# Patient Record
Sex: MALE
[De-identification: names, ages, dates, MRNs, and addresses within clinical notes are randomized per-mention and may not be internally consistent; named-entity substitution may affect disease eponyms.]

---

## 2019-02-01 ENCOUNTER — APPOINTMENT (OUTPATIENT)
Dept: ENDOCRINOLOGY | Facility: CLINIC | Age: 39
End: 2019-02-01
Payer: COMMERCIAL

## 2019-02-01 VITALS
HEART RATE: 99 BPM | BODY MASS INDEX: 28.97 KG/M2 | WEIGHT: 176 LBS | HEIGHT: 65.5 IN | DIASTOLIC BLOOD PRESSURE: 81 MMHG | SYSTOLIC BLOOD PRESSURE: 140 MMHG

## 2019-02-01 DIAGNOSIS — Z83.3 FAMILY HISTORY OF DIABETES MELLITUS: ICD-10-CM

## 2019-02-01 PROCEDURE — 99204 OFFICE O/P NEW MOD 45 MIN: CPT

## 2019-02-01 NOTE — REASON FOR VISIT
[Initial Eval - Existing Diagnosis] : an initial evaluation of an existing diagnosis [FreeTextEntry1] : diabetes

## 2019-02-01 NOTE — HISTORY OF PRESENT ILLNESS
[FreeTextEntry1] : Diabetes diagnosed in 2007 (age 26). both parents have diabetes.  has one younger brother (3 years younger) who does not have diabetes (brother is thinner).  parents were diagnosed in their 50s.\torey has not been testing glucose, doesn't even remember what kind of meter he has \par no polyuria, polydipsia, SOB, chest pain, or neuropathy symptoms \par no blurry vision.  up to date with ophtho, no DR\torey says last A1c was high, 9%, one month ago\torey doesn't like Victoza, getting diarrhea and abdominal cramps in the mornings, which interferes with his work meetings\torey feels bloated\torey admits to adding a lot of sugar to his coffee (can be 5 sugars!) and eating rice ( diet)\par \par Meds:\par metformin 500mg, Januvia, Victoza 1.8mg/day\par Keppra, Depakote 500mg, 3 tab/day\par losartan

## 2019-02-01 NOTE — PHYSICAL EXAM
[Alert] : alert [Healthy Appearance] : healthy appearance [Normal Voice/Communication] : normal voice communication [No Proptosis] : no proptosis [No Lid Lag] : no lid lag [Normal Hearing] : hearing was normal [Clear to Auscultation] : lungs were clear to auscultation bilaterally [Normal S1, S2] : normal S1 and S2 [Regular Rhythm] : with a regular rhythm [Pedal Pulses Normal] : the pedal pulses are present [No Edema] : there was no peripheral edema [de-identified] : L side roque than R

## 2019-02-01 NOTE — REVIEW OF SYSTEMS
[Recent Weight Gain (___ Lbs)] : recent [unfilled] ~Ulb weight gain [Dysphonia] : dysphonia [Diarrhea] : diarrhea [Hair Loss] : hair loss [All other systems negative] : All other systems negative

## 2019-02-01 NOTE — ASSESSMENT
[FreeTextEntry1] : Diabetes, uncontrolled. goal A1c < 7.0%.\par Potential complications of diabetes reviewed (blindness, kidney disease, nerve damage) and importance of glucose control discussed to prevent complications. Limit carb portions to one carb per meal and should be only half cup serving.  Avoid juice or sugared beverages (try no sugar in coffee).  Test sugars 4x/week: twice in am  (am goal < 130) and twice/week at  bedtime/post dinner (goal < 170). Foot care discussed. \par Discontinue Januvia, which is redundant with GLP1 agonist.  Change Victoza to once/week Ozempic to make dosing easier and since he is having side effects to Victoza, though I think some of these side effects may be due to metformin.  Recommended splitting metformin dose to 2 tab bid, rather than 4 tabs in the evening.  Add Invokana 100mg to get A1c to goal.\par exercise goal 30 min/day.\par samples given for Ozempic and Invokana\par RTO 3 months, will do labs at next visit.

## 2019-02-04 ENCOUNTER — MEDICATION RENEWAL (OUTPATIENT)
Age: 39
End: 2019-02-04

## 2019-02-04 RX ORDER — CANAGLIFLOZIN 100 MG/1
100 TABLET, FILM COATED ORAL
Qty: 90 | Refills: 1 | Status: DISCONTINUED | OUTPATIENT
Start: 2019-02-01 | End: 2019-02-04

## 2019-02-06 ENCOUNTER — MEDICATION RENEWAL (OUTPATIENT)
Age: 39
End: 2019-02-06

## 2019-02-06 RX ORDER — METFORMIN HYDROCHLORIDE 1000 MG/1
1000 TABLET, EXTENDED RELEASE ORAL
Qty: 180 | Refills: 3 | Status: DISCONTINUED | COMMUNITY
Start: 1900-01-01 | End: 2019-02-06

## 2019-02-14 ENCOUNTER — MEDICATION RENEWAL (OUTPATIENT)
Age: 39
End: 2019-02-14

## 2019-05-10 ENCOUNTER — MEDICATION RENEWAL (OUTPATIENT)
Age: 39
End: 2019-05-10

## 2019-05-24 ENCOUNTER — APPOINTMENT (OUTPATIENT)
Dept: ENDOCRINOLOGY | Facility: CLINIC | Age: 39
End: 2019-05-24

## 2019-08-21 ENCOUNTER — APPOINTMENT (OUTPATIENT)
Dept: ENDOCRINOLOGY | Facility: CLINIC | Age: 39
End: 2019-08-21
Payer: COMMERCIAL

## 2019-08-21 VITALS
WEIGHT: 167 LBS | BODY MASS INDEX: 27.37 KG/M2 | SYSTOLIC BLOOD PRESSURE: 119 MMHG | HEART RATE: 90 BPM | DIASTOLIC BLOOD PRESSURE: 75 MMHG

## 2019-08-21 PROCEDURE — 99214 OFFICE O/P EST MOD 30 MIN: CPT

## 2019-08-21 RX ORDER — LANCETS
EACH MISCELLANEOUS
Qty: 1 | Refills: 3 | Status: DISCONTINUED | COMMUNITY
Start: 2019-05-10 | End: 2019-08-21

## 2019-08-21 NOTE — ASSESSMENT
[FreeTextEntry1] : Diabetes, goal A1c < 7.0%.\par continue current regimen.   Can titrate Ozempic or Jardiance dose, if needed, but if A1c is in the low 6 range, will reduce metformin dose. recommended testing some post prandial sugars because often, glucose may be normal before eating but high after eating.\par labs today\par RTO 3-6 months, depending on A1c result

## 2019-08-21 NOTE — HISTORY OF PRESENT ILLNESS
[FreeTextEntry1] : one touch ultra meter: 74, 102, 101, 97, 86, 101 (testing am only, testing infrequently)\par lost weight, intentionally,  eating better and exercising more\par unable to eat large amounts, probably due to Ozempic,  and fewer side effects than Victoza\par traveling for work for past 9 months.  eating oat meal for breakfast\par up to date with ophtho, went earlier this year, eyes are ok.\par vision gets blurry after he is looking at computer for too long\par no polyuria, polydipsia, SOB, chest pain, or neuropathy symptoms \par \par Meds:\par Glucophage XR 500mg 2tab bid,  Ozempic 0.5 mg/week, Jardiance 10mg\par [id number] bid, Depakote 500mg, 3 tab/day\par losartan 25mg

## 2019-08-21 NOTE — PHYSICAL EXAM
[Alert] : alert [Healthy Appearance] : healthy appearance [Normal Voice/Communication] : normal voice communication [No Proptosis] : no proptosis [No Lid Lag] : no lid lag [Normal Hearing] : hearing was normal [Clear to Auscultation] : lungs were clear to auscultation bilaterally [Normal S1, S2] : normal S1 and S2 [Regular Rhythm] : with a regular rhythm [Pedal Pulses Normal] : the pedal pulses are present [No Edema] : there was no peripheral edema [Normal Sensation on Monofilament Testing] : normal sensation on monofilament testing of lower extremities [Normal Affect] : the affect was normal [Normal Mood] : the mood was normal [Foot Ulcers] : no foot ulcers [de-identified] : thyroid palpable [de-identified] : mild acanthosis nigricans

## 2019-08-22 LAB
ALBUMIN SERPL ELPH-MCNC: 4.4 G/DL
ALP BLD-CCNC: 35 U/L
ALT SERPL-CCNC: 21 U/L
ANION GAP SERPL CALC-SCNC: 13 MMOL/L
AST SERPL-CCNC: 19 U/L
BILIRUB SERPL-MCNC: 0.3 MG/DL
BUN SERPL-MCNC: 12 MG/DL
CALCIUM SERPL-MCNC: 9.7 MG/DL
CHLORIDE SERPL-SCNC: 101 MMOL/L
CHOLEST SERPL-MCNC: 185 MG/DL
CHOLEST/HDLC SERPL: 4 RATIO
CO2 SERPL-SCNC: 27 MMOL/L
CREAT SERPL-MCNC: 0.92 MG/DL
CREAT SPEC-SCNC: 190 MG/DL
ESTIMATED AVERAGE GLUCOSE: 131 MG/DL
GLUCOSE SERPL-MCNC: 107 MG/DL
HBA1C MFR BLD HPLC: 6.2 %
HDLC SERPL-MCNC: 46 MG/DL
LDLC SERPL CALC-MCNC: 110 MG/DL
MICROALBUMIN 24H UR DL<=1MG/L-MCNC: 1.7 MG/DL
MICROALBUMIN/CREAT 24H UR-RTO: 9 MG/G
POTASSIUM SERPL-SCNC: 4.6 MMOL/L
PROT SERPL-MCNC: 7.2 G/DL
SODIUM SERPL-SCNC: 141 MMOL/L
TRIGL SERPL-MCNC: 143 MG/DL
TSH SERPL-ACNC: 2.27 UIU/ML

## 2019-09-24 ENCOUNTER — MEDICATION RENEWAL (OUTPATIENT)
Age: 39
End: 2019-09-24

## 2019-10-14 ENCOUNTER — APPOINTMENT (OUTPATIENT)
Dept: ENDOCRINOLOGY | Facility: CLINIC | Age: 39
End: 2019-10-14

## 2019-11-13 ENCOUNTER — APPOINTMENT (OUTPATIENT)
Dept: ENDOCRINOLOGY | Facility: CLINIC | Age: 39
End: 2019-11-13
Payer: COMMERCIAL

## 2019-11-13 ENCOUNTER — TRANSCRIPTION ENCOUNTER (OUTPATIENT)
Age: 39
End: 2019-11-13

## 2019-11-13 VITALS
SYSTOLIC BLOOD PRESSURE: 115 MMHG | BODY MASS INDEX: 27.53 KG/M2 | DIASTOLIC BLOOD PRESSURE: 72 MMHG | WEIGHT: 168 LBS | HEART RATE: 93 BPM

## 2019-11-13 PROCEDURE — 99214 OFFICE O/P EST MOD 30 MIN: CPT

## 2019-11-13 NOTE — HISTORY OF PRESENT ILLNESS
[FreeTextEntry1] : one touch Mini meter: \par am: 128, 117, 116, 74\par PM: 125, 150, 106, 167, 136\par still traveling a lot.  weight has been stable.\par recently treated for sinus infection.\par up to date with ophtho, and has appt for December\par no polyuria, polydipsia, SOB, chest pain, or neuropathy symptoms \par got flu vaccine.\par energy is good\par \par Meds:\par Glucophage XR 500mg 1 tab bid,  Ozempic 0.5 mg/week, Jardiance 10mg\par atrovastatin 10mg\par [id number] bid, Depakote 500mg, 3 tab/day\par losartan 25mg

## 2019-11-13 NOTE — ASSESSMENT
[FreeTextEntry1] : Diabetes, goal A1c < 7.0%.\par continue current regimen.   \par increased physical activity recommended.\par can titrate up Ozempic dose to 1mg/week, if needed (if A1c is high or if gaining weight).\par RTO 6 months

## 2019-11-13 NOTE — PHYSICAL EXAM
[Alert] : alert [Healthy Appearance] : healthy appearance [Normal Voice/Communication] : normal voice communication [Normal Hearing] : hearing was normal [No Proptosis] : no proptosis [No Lid Lag] : no lid lag [Clear to Auscultation] : lungs were clear to auscultation bilaterally [Normal S1, S2] : normal S1 and S2 [Regular Rhythm] : with a regular rhythm [Pedal Pulses Normal] : the pedal pulses are present [No Edema] : there was no peripheral edema [Normal Affect] : the affect was normal [Normal Sensation on Monofilament Testing] : normal sensation on monofilament testing of lower extremities [Normal Mood] : the mood was normal [de-identified] : thyroid palpable [Foot Ulcers] : no foot ulcers [de-identified] : mild acanthosis nigricans

## 2019-11-13 NOTE — DATA REVIEWED
[FreeTextEntry1] : 8/19: A1c 6.2%, Cr 0.92, tot chol 185, trig 143, HDL 46, , urine microalbumin 9, TSH 2.27

## 2019-11-14 LAB
ALBUMIN SERPL ELPH-MCNC: 4.5 G/DL
ALP BLD-CCNC: 43 U/L
ALT SERPL-CCNC: 18 U/L
ANION GAP SERPL CALC-SCNC: 17 MMOL/L
AST SERPL-CCNC: 17 U/L
BILIRUB SERPL-MCNC: 0.3 MG/DL
BUN SERPL-MCNC: 14 MG/DL
CALCIUM SERPL-MCNC: 10.1 MG/DL
CHLORIDE SERPL-SCNC: 98 MMOL/L
CHOLEST SERPL-MCNC: 137 MG/DL
CHOLEST/HDLC SERPL: 3.3 RATIO
CO2 SERPL-SCNC: 26 MMOL/L
CREAT SERPL-MCNC: 1.01 MG/DL
ESTIMATED AVERAGE GLUCOSE: 134 MG/DL
GLUCOSE SERPL-MCNC: 186 MG/DL
HBA1C MFR BLD HPLC: 6.3 %
HDLC SERPL-MCNC: 42 MG/DL
LDLC SERPL CALC-MCNC: 47 MG/DL
POTASSIUM SERPL-SCNC: 4.7 MMOL/L
PROT SERPL-MCNC: 7.3 G/DL
SODIUM SERPL-SCNC: 141 MMOL/L
TRIGL SERPL-MCNC: 238 MG/DL

## 2019-12-11 ENCOUNTER — TRANSCRIPTION ENCOUNTER (OUTPATIENT)
Age: 39
End: 2019-12-11

## 2020-01-10 ENCOUNTER — FORM ENCOUNTER (OUTPATIENT)
Age: 40
End: 2020-01-10

## 2020-01-11 ENCOUNTER — OUTPATIENT (OUTPATIENT)
Dept: OUTPATIENT SERVICES | Facility: HOSPITAL | Age: 40
LOS: 1 days | End: 2020-01-11

## 2020-01-11 ENCOUNTER — APPOINTMENT (OUTPATIENT)
Dept: ULTRASOUND IMAGING | Facility: CLINIC | Age: 40
End: 2020-01-11
Payer: COMMERCIAL

## 2020-01-11 PROCEDURE — 76536 US EXAM OF HEAD AND NECK: CPT | Mod: 26

## 2020-01-17 ENCOUNTER — TRANSCRIPTION ENCOUNTER (OUTPATIENT)
Age: 40
End: 2020-01-17

## 2020-01-27 ENCOUNTER — TRANSCRIPTION ENCOUNTER (OUTPATIENT)
Age: 40
End: 2020-01-27

## 2020-04-15 ENCOUNTER — TRANSCRIPTION ENCOUNTER (OUTPATIENT)
Age: 40
End: 2020-04-15

## 2020-04-28 ENCOUNTER — TRANSCRIPTION ENCOUNTER (OUTPATIENT)
Age: 40
End: 2020-04-28

## 2020-05-04 ENCOUNTER — TRANSCRIPTION ENCOUNTER (OUTPATIENT)
Age: 40
End: 2020-05-04

## 2020-05-06 ENCOUNTER — TRANSCRIPTION ENCOUNTER (OUTPATIENT)
Age: 40
End: 2020-05-06

## 2020-05-20 ENCOUNTER — APPOINTMENT (OUTPATIENT)
Dept: ENDOCRINOLOGY | Facility: CLINIC | Age: 40
End: 2020-05-20
Payer: COMMERCIAL

## 2020-05-20 PROCEDURE — 99214 OFFICE O/P EST MOD 30 MIN: CPT | Mod: 95

## 2020-05-20 RX ORDER — LOSARTAN POTASSIUM 25 MG/1
25 TABLET, FILM COATED ORAL DAILY
Qty: 90 | Refills: 3 | Status: DISCONTINUED | COMMUNITY
Start: 1900-01-01 | End: 2020-05-20

## 2020-05-21 NOTE — DATA REVIEWED
[FreeTextEntry1] : 5/20: A1c 7.1%, urine microalbumin/cr 6, B12 756, TSH 2.76\par 11/19: A1c 6.3%, tot chol 137, trig 238, HDL 42, LDL 47\par 8/19: A1c 6.2%, Cr 0.92, tot chol 185, trig 143, HDL 46, , urine microalbumin 9, TSH 2.27. \par \par thyroid sono\par 3mm nodule

## 2020-05-21 NOTE — ASSESSMENT
[FreeTextEntry1] : Diabetes, goal A1c < 7.0%. (near goal)\par A1c increase since last visit probably due to reduced physical activity\par Recommended increasing physical activity, doing some exercise for 10 min every few hours so he's not sitting all day.\par suggested melatonin, shutting of devices at night to improve sleep.  Exercising during the day (but not within 2 hours of bedtime) also may help, to make his body more tired at the end o the day.\par It is not know whether being on ACEI or ARB has negative outcomes with Covid infection and these meds are beneficial for heart and kidneys, but since his urine microalbumin is normal (and always has been) and BP is normal, will discontinue losartan for now.  If urine microalbumin increases or BP increases, can restart.\par continue statin therapy.\par RTO 6 months

## 2020-05-21 NOTE — REASON FOR VISIT
[Home] : at home, [unfilled] , at the time of the visit. [Medical Office: ([facility])___] : at the medical office located in  [Follow - Up] : a follow-up visit [DM Type 2] : DM Type 2 [Verbal consent obtained from patient] : the patient, [unfilled]

## 2020-05-21 NOTE — HISTORY OF PRESENT ILLNESS
[Verbal consent obtained from patient] : the patient, [unfilled] [FreeTextEntry1] : no health issues since last visit\par has been sitting a lot during pandemic\par no polyuria, polydipsia, SOB, chest pain, or neuropathy symptoms \par main issue is sleep disturbance.  He has been waking up around 1am and then cannot fall back asleep until 4am, and then is very tired in the morning.  Last night, he fell asleep at 9pm, woke up at 1am and was awake until 4am.  He is not getting to urinate (that is not what is waking him up).  He feels sleepy around 4-5pm but doesn't take a nap.  He does not snore (that he knows of)\par He wonders if he should stop losartan because of reports that Covid infection may be worse in patients taking these meds (ACEI and ARB)\par \par Meds:\par Glucophage XR 500mg 1 tab bid,  Ozempic 0.5 mg/week, Jardiance 10mg\par atrovastatin 10mg\par [id number] bid, Depakote 500mg, 3 tab/day\par losartan 25mg

## 2020-09-07 ENCOUNTER — RX RENEWAL (OUTPATIENT)
Age: 40
End: 2020-09-07

## 2020-09-12 ENCOUNTER — WALK IN (OUTPATIENT)
Dept: URGENT CARE | Age: 40
End: 2020-09-12

## 2020-09-12 VITALS
OXYGEN SATURATION: 99 % | SYSTOLIC BLOOD PRESSURE: 120 MMHG | DIASTOLIC BLOOD PRESSURE: 80 MMHG | RESPIRATION RATE: 20 BRPM | HEART RATE: 92 BPM | TEMPERATURE: 97.6 F

## 2020-09-12 DIAGNOSIS — R51.9 NONINTRACTABLE HEADACHE, UNSPECIFIED CHRONICITY PATTERN, UNSPECIFIED HEADACHE TYPE: ICD-10-CM

## 2020-09-12 DIAGNOSIS — J02.9 VIRAL PHARYNGITIS: Primary | ICD-10-CM

## 2020-09-12 DIAGNOSIS — J02.9 ACUTE PHARYNGITIS, UNSPECIFIED: ICD-10-CM

## 2020-09-12 DIAGNOSIS — R51.9 HEADACHE: ICD-10-CM

## 2020-09-12 DIAGNOSIS — J06.9 ACUTE UPPER RESPIRATORY INFECTION, UNSPECIFIED: ICD-10-CM

## 2020-09-12 DIAGNOSIS — R05.9 COUGH: ICD-10-CM

## 2020-09-12 DIAGNOSIS — J06.9 UPPER RESPIRATORY TRACT INFECTION, UNSPECIFIED TYPE: ICD-10-CM

## 2020-09-12 DIAGNOSIS — Z20.822 SUSPECTED COVID-19 VIRUS INFECTION: ICD-10-CM

## 2020-09-12 PROCEDURE — 99202 OFFICE O/P NEW SF 15 MIN: CPT | Performed by: FAMILY MEDICINE

## 2020-09-12 PROCEDURE — U0003 INFECTIOUS AGENT DETECTION BY NUCLEIC ACID (DNA OR RNA); SEVERE ACUTE RESPIRATORY SYNDROME CORONAVIRUS 2 (SARS-COV-2) (CORONAVIRUS DISEASE [COVID-19]), AMPLIFIED PROBE TECHNIQUE, MAKING USE OF HIGH THROUGHPUT TECHNOLOGIES AS DESCRIBED BY CMS-2020-01-R: HCPCS | Performed by: FAMILY MEDICINE

## 2020-09-12 RX ORDER — LEVETIRACETAM 1000 MG/1
1000 TABLET ORAL 2 TIMES DAILY
COMMUNITY

## 2020-09-12 RX ORDER — DIVALPROEX SODIUM 500 MG/1
500 TABLET, EXTENDED RELEASE ORAL DAILY
COMMUNITY

## 2020-09-12 RX ORDER — METFORMIN HYDROCHLORIDE 500 MG/1
500 TABLET, EXTENDED RELEASE ORAL
COMMUNITY

## 2020-09-14 LAB
SARS-COV-2 RNA SPEC QL NAA+PROBE: NOT DETECTED
SPECIMEN SOURCE: NORMAL

## 2020-09-25 ENCOUNTER — RX RENEWAL (OUTPATIENT)
Age: 40
End: 2020-09-25

## 2020-10-12 ENCOUNTER — TRANSCRIPTION ENCOUNTER (OUTPATIENT)
Age: 40
End: 2020-10-12

## 2020-11-22 ENCOUNTER — TRANSCRIPTION ENCOUNTER (OUTPATIENT)
Age: 40
End: 2020-11-22

## 2020-12-21 ENCOUNTER — RX RENEWAL (OUTPATIENT)
Age: 40
End: 2020-12-21

## 2021-02-16 ENCOUNTER — APPOINTMENT (OUTPATIENT)
Dept: ENDOCRINOLOGY | Facility: CLINIC | Age: 41
End: 2021-02-16
Payer: COMMERCIAL

## 2021-02-16 VITALS — WEIGHT: 164 LBS | BODY MASS INDEX: 26.88 KG/M2

## 2021-02-16 PROCEDURE — 99213 OFFICE O/P EST LOW 20 MIN: CPT | Mod: 95

## 2021-02-16 RX ORDER — DIVALPROEX SODIUM 500 MG/1
500 TABLET, DELAYED RELEASE ORAL
Refills: 0 | Status: DISCONTINUED | COMMUNITY
End: 2021-02-16

## 2021-02-16 RX ORDER — METFORMIN HYDROCHLORIDE 500 MG/1
500 TABLET, FILM COATED, EXTENDED RELEASE ORAL
Qty: 360 | Refills: 1 | Status: DISCONTINUED | COMMUNITY
Start: 2019-02-06 | End: 2021-02-16

## 2021-02-16 NOTE — DATA REVIEWED
[FreeTextEntry1] : 12/20: A1c 6.6%, tot chol 157, HDL 42, LDL 90, trig 152, CMP normal\par 5/20: A1c 7.1%, urine microalbumin/cr 6, B12 756, TSH 2.76\par 11/19: A1c 6.3%, tot chol 137, trig 238, HDL 42, LDL 47\par 8/19: A1c 6.2%, Cr 0.92, tot chol 185, trig 143, HDL 46, , urine microalbumin 9, TSH 2.27. \par \par thyroid sono, 1/20: homogenous, normovascular\par L mid pole cyst 3mm

## 2021-02-16 NOTE — REASON FOR VISIT
[Home] : at home, [unfilled] , at the time of the visit. [Medical Office: ([facility])___] : at the medical office located in  [Verbal consent obtained from patient] : the patient, [unfilled] [Follow - Up] : a follow-up visit [DM Type 2] : DM Type 2

## 2021-02-16 NOTE — ASSESSMENT
[FreeTextEntry1] : Diabetes, goal A1c < 7.0%.\par continue current regimen.   \par will send [facility] form to check labs before next appointment \par ophtho recommended.\par RTO 6 months

## 2021-02-16 NOTE — HISTORY OF PRESENT ILLNESS
[FreeTextEntry1] : no health issues since last visit\par moved from [place] to [place], but still in a 3rd floor walk up so he gets his walking in\par no dizzy spells\par no SOB, chest pain, or neuropathy symptoms \par has not seen ophtho recently.  had appointment in December but it was canceled by ophtho\par tested negative for Covid twice, last test was a few weeks ago\par current weight is 164 lb\par \par Meds:\par Glucophage XR 500mg 1 tab bid,  Ozempic 0.5 mg/week, Jardiance 10mg\par atrovastatin 10mg\par [id number] bid, Depakote 500mg, 3 tab/day\par losartan 25mg

## 2021-03-01 ENCOUNTER — RX RENEWAL (OUTPATIENT)
Age: 41
End: 2021-03-01

## 2021-03-01 ENCOUNTER — NON-APPOINTMENT (OUTPATIENT)
Age: 41
End: 2021-03-01

## 2021-03-09 ENCOUNTER — TRANSCRIPTION ENCOUNTER (OUTPATIENT)
Age: 41
End: 2021-03-09

## 2021-03-09 ENCOUNTER — NON-APPOINTMENT (OUTPATIENT)
Age: 41
End: 2021-03-09

## 2021-03-18 ENCOUNTER — APPOINTMENT (OUTPATIENT)
Dept: FAMILY MEDICINE | Facility: CLINIC | Age: 41
End: 2021-03-18
Payer: COMMERCIAL

## 2021-03-18 VITALS
DIASTOLIC BLOOD PRESSURE: 77 MMHG | BODY MASS INDEX: 26.36 KG/M2 | HEIGHT: 66 IN | TEMPERATURE: 97.2 F | OXYGEN SATURATION: 99 % | HEART RATE: 86 BPM | WEIGHT: 164 LBS | SYSTOLIC BLOOD PRESSURE: 122 MMHG

## 2021-03-18 PROCEDURE — 99386 PREV VISIT NEW AGE 40-64: CPT | Mod: 25

## 2021-03-18 PROCEDURE — G0442 ANNUAL ALCOHOL SCREEN 15 MIN: CPT

## 2021-03-18 PROCEDURE — 36415 COLL VENOUS BLD VENIPUNCTURE: CPT

## 2021-03-18 PROCEDURE — 99072 ADDL SUPL MATRL&STAF TM PHE: CPT

## 2021-03-18 NOTE — HEALTH RISK ASSESSMENT
[Good] : ~his/her~  mood as  good [] : Yes [0-5] : 0-5 [Yes] : Yes [Monthly or less (1 pt)] : Monthly or less (1 point) [1 or 2 (0 pts)] : 1 or 2 (0 points) [Never (0 pts)] : Never (0 points) [No] : In the past 12 months have you used drugs other than those required for medical reasons? No [0] : 2) Feeling down, depressed, or hopeless: Not at all (0) [Audit-CScore] : 1 [de-identified] : walking [de-identified] : fruits, veggies  [[id number]] : 0 [HIV test declined] : HIV test declined [Hepatitis C test declined] : Hepatitis C test declined [Change in mental status noted] : No change in mental status noted [Language] : denies difficulty with language [None] : None [With Family] : lives with family [# of Members in Household ___] :  household currently consist of [unfilled] member(s) [Employed] : employed [] :  [# Of Children ___] : has [unfilled] children [Sexually Active] : sexually active [High Risk Behavior] : no high risk behavior [Feels Safe at Home] : Feels safe at home [Fully functional (bathing, dressing, toileting, transferring, walking, feeding)] : Fully functional (bathing, dressing, toileting, transferring, walking, feeding) [Fully functional (using the telephone, shopping, preparing meals, housekeeping, doing laundry, using] : Fully functional and needs no help or supervision to perform IADLs (using the telephone, shopping, preparing meals, housekeeping, doing laundry, using transportation, managing medications and managing finances) [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

## 2021-03-18 NOTE — HISTORY OF PRESENT ILLNESS
[FreeTextEntry1] : establish care  [de-identified] : 41 yo m presents to establish care. \par Has not followed with pcp in a few years, all was normal last time. \par Follows with endo at [facility] for his thyroid, diabetes, and cholesterol. \par No complaints today aside from being tired, falling asleep, but trouble staying asleep.\par One caffinated beverage in the AM, does not wake up with racing thoughts, urination wakes him up. Mentioned recent concern. Mentioned glass of water prior to bed.

## 2021-03-18 NOTE — PLAN
[FreeTextEntry1] : follow up labs, labs drawn in office \par \par discussed urinary freq/ drinking prior to going to sleep, encouraged not to do so\par if still trouble sleeping, will discuss next steps\par also will follow up psa to r/o prostate involvement \par will follow up labs to make sure not indication of diabetes \par unlikely related to mood/ anxiety based on current description

## 2021-03-23 ENCOUNTER — TRANSCRIPTION ENCOUNTER (OUTPATIENT)
Age: 41
End: 2021-03-23

## 2021-03-23 LAB
25(OH)D3 SERPL-MCNC: 43.3 NG/ML
ALBUMIN SERPL ELPH-MCNC: 4.3 G/DL
ALP BLD-CCNC: 39 U/L
ALT SERPL-CCNC: 12 U/L
ANION GAP SERPL CALC-SCNC: 18 MMOL/L
AST SERPL-CCNC: 16 U/L
BASOPHILS # BLD AUTO: 0.03 K/UL
BASOPHILS NFR BLD AUTO: 0.3 %
BILIRUB SERPL-MCNC: 0.4 MG/DL
BUN SERPL-MCNC: 10 MG/DL
CALCIUM SERPL-MCNC: 9.5 MG/DL
CHLORIDE SERPL-SCNC: 102 MMOL/L
CHOLEST SERPL-MCNC: 126 MG/DL
CO2 SERPL-SCNC: 24 MMOL/L
CREAT SERPL-MCNC: 0.99 MG/DL
EOSINOPHIL # BLD AUTO: 0.28 K/UL
EOSINOPHIL NFR BLD AUTO: 2.9 %
ESTIMATED AVERAGE GLUCOSE: 146 MG/DL
GLUCOSE SERPL-MCNC: 109 MG/DL
HBA1C MFR BLD HPLC: 6.7 %
HCT VFR BLD CALC: 49.2 %
HDLC SERPL-MCNC: 40 MG/DL
HGB BLD-MCNC: 16 G/DL
IMM GRANULOCYTES NFR BLD AUTO: 0.2 %
LDLC SERPL CALC-MCNC: 61 MG/DL
LYMPHOCYTES # BLD AUTO: 3.88 K/UL
LYMPHOCYTES NFR BLD AUTO: 39.9 %
MAN DIFF?: NORMAL
MCHC RBC-ENTMCNC: 30.1 PG
MCHC RBC-ENTMCNC: 32.5 GM/DL
MCV RBC AUTO: 92.5 FL
MONOCYTES # BLD AUTO: 0.67 K/UL
MONOCYTES NFR BLD AUTO: 6.9 %
NEUTROPHILS # BLD AUTO: 4.84 K/UL
NEUTROPHILS NFR BLD AUTO: 49.8 %
NONHDLC SERPL-MCNC: 86 MG/DL
PLATELET # BLD AUTO: 178 K/UL
POTASSIUM SERPL-SCNC: 4.2 MMOL/L
PROT SERPL-MCNC: 6.8 G/DL
PSA FREE FLD-MCNC: 42 %
PSA FREE SERPL-MCNC: 0.3 NG/ML
PSA SERPL-MCNC: 0.73 NG/ML
RBC # BLD: 5.32 M/UL
RBC # FLD: 12.9 %
SODIUM SERPL-SCNC: 143 MMOL/L
TRIGL SERPL-MCNC: 123 MG/DL
TSH SERPL-ACNC: 4.01 UIU/ML
WBC # FLD AUTO: 9.72 K/UL

## 2021-04-06 ENCOUNTER — RX RENEWAL (OUTPATIENT)
Age: 41
End: 2021-04-06

## 2021-06-03 ENCOUNTER — APPOINTMENT (OUTPATIENT)
Dept: NEUROLOGY | Facility: CLINIC | Age: 41
End: 2021-06-03
Payer: COMMERCIAL

## 2021-06-03 VITALS
OXYGEN SATURATION: 97 % | DIASTOLIC BLOOD PRESSURE: 79 MMHG | HEIGHT: 66 IN | BODY MASS INDEX: 26.84 KG/M2 | RESPIRATION RATE: 16 BRPM | HEART RATE: 78 BPM | TEMPERATURE: 97.9 F | SYSTOLIC BLOOD PRESSURE: 157 MMHG | WEIGHT: 167 LBS

## 2021-06-03 PROCEDURE — 99204 OFFICE O/P NEW MOD 45 MIN: CPT

## 2021-06-03 PROCEDURE — 99072 ADDL SUPL MATRL&STAF TM PHE: CPT

## 2021-06-03 RX ORDER — METFORMIN ER 500 MG 500 MG/1
500 TABLET ORAL
Qty: 360 | Refills: 1 | Status: DISCONTINUED | COMMUNITY
Start: 2020-05-04 | End: 2021-06-03

## 2021-06-03 NOTE — HISTORY OF PRESENT ILLNESS
[FreeTextEntry1] : \par Rickey is a 41 yo man moved to [place]\par \par Notes from 2018 showed his LEV was 2000mg, bid \par \par Onset was in juvenile age group, age 8, per notes.  He recalls being in his living room, he felt frozen and could not turn his head, then dropped, found by family members.\par MRI was negative, EEG showed abnormalities but not specific.\par \par 2015 moved to [facility] from [place], followed at [facility] then, but transferred to Dr. Soto at [facility].  \par Most recent seizure was in 2014, that occurred while attempting to stop levetiracetam completely, per notes.\par \par He is noted to have irritability in the morning, but his wife has known him since he was on LEV.\par Depakote is well-tolerated, though has noted hair thinning gradually since 2009-10.\par \par Supplements include fish oil and multivitamins.\par \par \par Diabetic since 2007, runs on both side of the family.

## 2021-06-03 NOTE — DISCUSSION/SUMMARY
[FreeTextEntry1] : Impression:\par 1) history of epilepsy,  seizure free since 2015, but on high dose dual therapy, and may not need to be on both at high doses, now 7 years seizure-free.  The most recent seizure in 2014 came with LEV dropping to zero, per notes, but unclear for how long and whether the taper was gradual, and what dose depakote was at the time.\par 2) hair thinning - possible adverse effects of depakote, so will be interested in seeing if we can limit the dose.\par \par Plan:\par 1) no EEGs recently, rEEG to start, but possibly ambEEG\par 2) MRI brain\par 3) levels today, vit D/B12 folate due to high doses and hair losses\par 4) require more thorough notes to evaluate potential for making medication changes.\par 5) RTC in 6 weeks

## 2021-06-04 LAB
25(OH)D3 SERPL-MCNC: 44.8 NG/ML
ALBUMIN SERPL ELPH-MCNC: 4.6 G/DL
ALP BLD-CCNC: 44 U/L
ALT SERPL-CCNC: 16 U/L
ANION GAP SERPL CALC-SCNC: 14 MMOL/L
AST SERPL-CCNC: 16 U/L
BASOPHILS # BLD AUTO: 0.04 K/UL
BASOPHILS NFR BLD AUTO: 0.5 %
BILIRUB SERPL-MCNC: 0.2 MG/DL
BUN SERPL-MCNC: 13 MG/DL
CALCIUM SERPL-MCNC: 10.2 MG/DL
CHLORIDE SERPL-SCNC: 102 MMOL/L
CO2 SERPL-SCNC: 26 MMOL/L
CREAT SERPL-MCNC: 0.96 MG/DL
EOSINOPHIL # BLD AUTO: 0.25 K/UL
EOSINOPHIL NFR BLD AUTO: 3.4 %
ESTIMATED AVERAGE GLUCOSE: 143 MG/DL
FERRITIN SERPL-MCNC: 430 NG/ML
FOLATE SERPL-MCNC: >20 NG/ML
GLUCOSE SERPL-MCNC: 138 MG/DL
HBA1C MFR BLD HPLC: 6.6 %
HCT VFR BLD CALC: 52 %
HGB BLD-MCNC: 16.3 G/DL
IMM GRANULOCYTES NFR BLD AUTO: 0.3 %
LYMPHOCYTES # BLD AUTO: 3.73 K/UL
LYMPHOCYTES NFR BLD AUTO: 51 %
MAGNESIUM SERPL-MCNC: 1.9 MG/DL
MAN DIFF?: NORMAL
MCHC RBC-ENTMCNC: 29.9 PG
MCHC RBC-ENTMCNC: 31.3 GM/DL
MCV RBC AUTO: 95.4 FL
MONOCYTES # BLD AUTO: 0.47 K/UL
MONOCYTES NFR BLD AUTO: 6.4 %
NEUTROPHILS # BLD AUTO: 2.8 K/UL
NEUTROPHILS NFR BLD AUTO: 38.4 %
PLATELET # BLD AUTO: 221 K/UL
POTASSIUM SERPL-SCNC: 4.5 MMOL/L
PROT SERPL-MCNC: 7.4 G/DL
RBC # BLD: 5.45 M/UL
RBC # FLD: 13.1 %
SODIUM SERPL-SCNC: 141 MMOL/L
VALPROATE SERPL-MCNC: 111 UG/ML
VIT B12 SERPL-MCNC: 870 PG/ML
WBC # FLD AUTO: 7.31 K/UL

## 2021-06-18 ENCOUNTER — RESULT REVIEW (OUTPATIENT)
Age: 41
End: 2021-06-18

## 2021-06-18 ENCOUNTER — OUTPATIENT (OUTPATIENT)
Dept: OUTPATIENT SERVICES | Facility: HOSPITAL | Age: 41
LOS: 1 days | End: 2021-06-18

## 2021-06-18 ENCOUNTER — APPOINTMENT (OUTPATIENT)
Dept: MRI IMAGING | Facility: CLINIC | Age: 41
End: 2021-06-18
Payer: COMMERCIAL

## 2021-06-18 PROCEDURE — 76377 3D RENDER W/INTRP POSTPROCES: CPT | Mod: 26

## 2021-06-18 PROCEDURE — 70551 MRI BRAIN STEM W/O DYE: CPT | Mod: 26

## 2021-06-25 ENCOUNTER — APPOINTMENT (OUTPATIENT)
Dept: NEUROLOGY | Facility: CLINIC | Age: 41
End: 2021-06-25
Payer: COMMERCIAL

## 2021-06-25 PROCEDURE — 99072 ADDL SUPL MATRL&STAF TM PHE: CPT

## 2021-06-25 PROCEDURE — 95819 EEG AWAKE AND ASLEEP: CPT

## 2021-07-20 ENCOUNTER — RX RENEWAL (OUTPATIENT)
Age: 41
End: 2021-07-20

## 2021-08-27 ENCOUNTER — RX RENEWAL (OUTPATIENT)
Age: 41
End: 2021-08-27

## 2021-10-11 ENCOUNTER — RX RENEWAL (OUTPATIENT)
Age: 41
End: 2021-10-11

## 2021-10-22 ENCOUNTER — APPOINTMENT (OUTPATIENT)
Dept: NEUROLOGY | Facility: CLINIC | Age: 41
End: 2021-10-22
Payer: COMMERCIAL

## 2021-10-22 ENCOUNTER — TRANSCRIPTION ENCOUNTER (OUTPATIENT)
Age: 41
End: 2021-10-22

## 2021-10-22 PROCEDURE — 99214 OFFICE O/P EST MOD 30 MIN: CPT | Mod: 95

## 2021-10-28 ENCOUNTER — TRANSCRIPTION ENCOUNTER (OUTPATIENT)
Age: 41
End: 2021-10-28

## 2021-11-10 NOTE — DISCUSSION/SUMMARY
[FreeTextEntry1] : Impression:\par 1) history of epilepsy,  seizure free since 2015, but on high dose dual therapy, and may not need to be on both at high doses, now 7 years seizure-free.  The most recent seizure in 2014 came with LEV dropping to zero, per notes, but unclear for how long and whether the taper was gradual, and what dose depakote was at the time.\par 2) hair thinning - possible adverse effects of depakote, so will be interested in seeing if we can limit the dose.\par \par Plan:\par 1) slight reduction in depakote reeg / ambEEG, first week of December\par 2) clonazepam PRN auras\par 3) nayzilam\par 4) RTC in 6 weeks

## 2021-11-10 NOTE — PHYSICAL EXAM
[FreeTextEntry1] : General:\par Constitutional:  Sitting comfortably in NAD.\par Psychiatric: well-groomed, appropriate affect, insight/judgment intact\par \par Cognitive:\par Orientation, language, memory and knowledge screens intact.\par No expressive or receptive errors.\par \par Cranial Nerves:\par VII: Face appears symmetric \par

## 2021-11-10 NOTE — HISTORY OF PRESENT ILLNESS
[Home] : at home, [unfilled] , at the time of the visit. [Medical Office: ([facility])___] : at the medical office located in  [Verbal consent obtained from patient] : the patient, [unfilled] [FreeTextEntry1] : \par Rickey is a 39 yo man moved to [place] from [place], seen in f/u today.\par \par VPA was 111 on 3x 500mg ER\par LEV 53.8 on 1000x2 bid.\par \par Jun/21: MRI brain seizure-protocol was negative for abnormalities.\par rEEG Jun 25, 2021: negative\par \par \par Notes from 2018 showed his LEV was 2000mg, bid \par \par Onset was in juvenile age group, age 8, per notes.  He recalls being in his living room, he felt frozen and could not turn his head, then dropped, found by family members.\par MRI was negative, EEG showed abnormalities but not specific.\par \par 2015 moved to [facility] from [place], followed at [facility] then, but transferred to Dr. Soto at [facility].  \par Most recent seizure was in 2014, that occurred while attempting to stop levetiracetam completely, per notes.\par \par He is noted to have irritability in the morning, but his wife has known him since he was on LEV.\par Depakote is well-tolerated, though has noted hair thinning gradually since 2009-10.\par \par Supplements include fish oil and multivitamins.\par \par Diabetic since 2007, runs on both side of the family.

## 2021-11-19 ENCOUNTER — RX RENEWAL (OUTPATIENT)
Age: 41
End: 2021-11-19

## 2021-11-22 ENCOUNTER — APPOINTMENT (OUTPATIENT)
Dept: ENDOCRINOLOGY | Facility: CLINIC | Age: 41
End: 2021-11-22
Payer: COMMERCIAL

## 2021-11-22 VITALS — BODY MASS INDEX: 27.12 KG/M2 | WEIGHT: 168 LBS

## 2021-11-22 PROCEDURE — 99214 OFFICE O/P EST MOD 30 MIN: CPT | Mod: 95

## 2021-11-22 NOTE — ASSESSMENT
[FreeTextEntry1] : Diabetes, goal A1c < 7.0%.  Hypercholesterolemia\par Titrate up Ozempic dose to 1mg/week, and can stay off metformin.\par Recommended increasing physical activity, which will also improve his sleep.\par Titrate atorvastatin to 20mg.\par RTO 6 months

## 2021-11-22 NOTE — DATA REVIEWED
[FreeTextEntry1] : 11/21: A1c 7.1%, tot chol 178, HDL 43, trig 162, , urine alb/cr 7, TSH 5.99\par 12/20: A1c 6.6%, tot chol 157, HDL 42, LDL 90, trig 152, CMP normal\par 5/20: A1c 7.1%, urine microalbumin/cr 6, B12 756, TSH 2.76\par 11/19: A1c 6.3%, tot chol 137, trig 238, HDL 42, LDL 47\par 8/19: A1c 6.2%, Cr 0.92, tot chol 185, trig 143, HDL 46, , urine microalbumin 9, TSH 2.27. \par \par thyroid sono, 1/20: homogenous, normovascular\par L mid pole cyst 3mm

## 2021-11-22 NOTE — HISTORY OF PRESENT ILLNESS
[Home] : at home, [unfilled] , at the time of the visit. [Medical Office: ([facility])___] : at the medical office located in  [Verbal consent obtained from patient] : the patient, [unfilled] [FreeTextEntry1] : no health issues since last visit\par He was not surprised that A1c was up from last visit b/c he is not as physically active, still working from home and he also stopped taking metformin because it makes him go to the bathroom 5-10 min after eating\par He walked 2 miles over weekend to get his Covid booster, but usually he does not have regular walking in his day\par he may move to a different building that has a gym\par He also may be snacking more since it is easily accessible\par current weight is 168 lb\par saw dede in Sept.\par Had some trouble sleeping but recently this has improved\par seizure meds being tapered by neurologist\par labs reviewed from 11/21: A1c 7.1%, \par \par \par Meds:\par Glucophage XR 500mg 1 tab bid,  Ozempic 0.5 mg/week, Jardiance 10mg\par atrovastatin 10mg\par [id number] bid, Depakote 500mg, 3 tab/day\par losartan 25mg

## 2021-12-16 ENCOUNTER — APPOINTMENT (OUTPATIENT)
Dept: NEUROLOGY | Facility: CLINIC | Age: 41
End: 2021-12-16
Payer: COMMERCIAL

## 2021-12-16 PROCEDURE — 95819 EEG AWAKE AND ASLEEP: CPT

## 2021-12-17 ENCOUNTER — APPOINTMENT (OUTPATIENT)
Dept: NEUROLOGY | Facility: CLINIC | Age: 41
End: 2021-12-17

## 2021-12-17 PROCEDURE — 95700 EEG CONT REC W/VID EEG TECH: CPT

## 2021-12-17 PROCEDURE — 95719 EEG PHYS/QHP EA INCR W/O VID: CPT

## 2021-12-17 PROCEDURE — 95708 EEG WO VID EA 12-26HR UNMNTR: CPT

## 2022-01-13 ENCOUNTER — TRANSCRIPTION ENCOUNTER (OUTPATIENT)
Age: 42
End: 2022-01-13

## 2022-01-14 LAB — LEVETIRACETAM SERPL-MCNC: 53.8 UG/ML

## 2022-02-18 ENCOUNTER — APPOINTMENT (OUTPATIENT)
Dept: FAMILY MEDICINE | Facility: CLINIC | Age: 42
End: 2022-02-18
Payer: COMMERCIAL

## 2022-02-18 VITALS
SYSTOLIC BLOOD PRESSURE: 122 MMHG | HEIGHT: 66 IN | DIASTOLIC BLOOD PRESSURE: 83 MMHG | HEART RATE: 96 BPM | OXYGEN SATURATION: 97 % | TEMPERATURE: 96.8 F | BODY MASS INDEX: 26.03 KG/M2 | WEIGHT: 162 LBS

## 2022-02-18 DIAGNOSIS — Z86.39 PERSONAL HISTORY OF OTHER ENDOCRINE, NUTRITIONAL AND METABOLIC DISEASE: ICD-10-CM

## 2022-02-18 DIAGNOSIS — F17.200 NICOTINE DEPENDENCE, UNSPECIFIED, UNCOMPLICATED: ICD-10-CM

## 2022-02-18 PROCEDURE — 99406 BEHAV CHNG SMOKING 3-10 MIN: CPT | Mod: 25

## 2022-02-18 PROCEDURE — 36415 COLL VENOUS BLD VENIPUNCTURE: CPT

## 2022-02-18 PROCEDURE — 93000 ELECTROCARDIOGRAM COMPLETE: CPT

## 2022-02-18 PROCEDURE — 99214 OFFICE O/P EST MOD 30 MIN: CPT | Mod: 25

## 2022-02-18 NOTE — COUNSELING
[Behavioral health counseling provided] : Behavioral health counseling provided [Cessation strategies including cessation program discussed] : Cessation strategies including cessation program discussed [Use of nicotine replacement therapies and other medications discussed] : Use of nicotine replacement therapies and other medications discussed [Encouraged to pick a quit date and identify support needed to quit] : Encouraged to pick a quit date and identify support needed to quit [FreeTextEntry1] : 5

## 2022-02-18 NOTE — PLAN
[FreeTextEntry1] : chest pain \par will follow up ekg in office today \par unlikely cardiac in origin \par in the setting of increased cholesterol, smoking, diabetes, will send to cardio for eval \par \par gerd \par likely cause of chest pain \par reviewed gerd friendly lifestyle and diet\par aware of what foods to avoid \par will do ppi trial \par aware of risks, benefits, side effects, alternatives, regimen of meds \par will do this 30 min prior to eating in AM \par \par diabetes, hld \par will follow up labs, reviewed prior labs \par cont meds \par discussed importance of diabetes and low fat diet and lifestyle

## 2022-02-18 NOTE — HISTORY OF PRESENT ILLNESS
[FreeTextEntry8] : cc: cp\par \par 42 yo m presents to discuss cp. Started a few weeks ago. Does not keep patient up at night. \par Localized to one spot in particular. Not related to movement or lifting or positional change. Not reproducible. \par Denies sob. \par Occurs after eating. Mentioned eats spicy foods, deep red sauces, onions, garlic, late at night before bed. \par Acid reflux runs in the family. \par Dull pain. \par Has not tried medications. \par \par

## 2022-02-22 ENCOUNTER — NON-APPOINTMENT (OUTPATIENT)
Age: 42
End: 2022-02-22

## 2022-02-22 LAB
ALBUMIN SERPL ELPH-MCNC: 4.8 G/DL
ALP BLD-CCNC: 49 U/L
ALT SERPL-CCNC: 16 U/L
ANION GAP SERPL CALC-SCNC: 13 MMOL/L
AST SERPL-CCNC: 17 U/L
BILIRUB SERPL-MCNC: 0.3 MG/DL
BUN SERPL-MCNC: 14 MG/DL
CALCIUM SERPL-MCNC: 9.9 MG/DL
CHLORIDE SERPL-SCNC: 104 MMOL/L
CHOLEST SERPL-MCNC: 140 MG/DL
CO2 SERPL-SCNC: 23 MMOL/L
CREAT SERPL-MCNC: 0.87 MG/DL
ESTIMATED AVERAGE GLUCOSE: 151 MG/DL
GLUCOSE SERPL-MCNC: 130 MG/DL
HBA1C MFR BLD HPLC: 6.9 %
HDLC SERPL-MCNC: 41 MG/DL
LDLC SERPL CALC-MCNC: 72 MG/DL
NONHDLC SERPL-MCNC: 100 MG/DL
POTASSIUM SERPL-SCNC: 4.3 MMOL/L
PROT SERPL-MCNC: 7 G/DL
SODIUM SERPL-SCNC: 140 MMOL/L
TRIGL SERPL-MCNC: 137 MG/DL
TSH SERPL-ACNC: 2.43 UIU/ML
UREA BREATH TEST QL: NEGATIVE

## 2022-02-25 ENCOUNTER — NON-APPOINTMENT (OUTPATIENT)
Age: 42
End: 2022-02-25

## 2022-02-25 ENCOUNTER — RX RENEWAL (OUTPATIENT)
Age: 42
End: 2022-02-25

## 2022-03-10 ENCOUNTER — APPOINTMENT (OUTPATIENT)
Dept: HEART AND VASCULAR | Facility: CLINIC | Age: 42
End: 2022-03-10
Payer: COMMERCIAL

## 2022-03-10 VITALS
HEART RATE: 96 BPM | TEMPERATURE: 98 F | BODY MASS INDEX: 26.36 KG/M2 | WEIGHT: 164 LBS | OXYGEN SATURATION: 98 % | HEIGHT: 66 IN | SYSTOLIC BLOOD PRESSURE: 134 MMHG | DIASTOLIC BLOOD PRESSURE: 80 MMHG

## 2022-03-10 PROCEDURE — 99205 OFFICE O/P NEW HI 60 MIN: CPT | Mod: 25

## 2022-03-10 NOTE — DISCUSSION/SUMMARY
[Patient] : the patient [___ Month(s)] : in [unfilled] month(s) [FreeTextEntry1] : \par 40 y/o male with h/o DM, hl, epilepsy, overweight, smoker who presents for initial evaluation of cp and sob\par \par -ekg 2/22 reviewed\par -labs 2022 reviewed\par -counseled on cvd risk factors\par -continue statin\par -CTA ordered cp\par -Echocardiogram ordered for cp\par -counseled on smoking cessation\par -start asa 81 mg qd\par -f/up with endo - continue dm management\par -neuro f/up for seizures\par -close monitoring bp to see if need medications\par -f/up 1 month for cp, sob\par \par -I have spent 60 minutes reviewing labs, records, tests and discussing cvd risk factors, cp/sob evaluation

## 2022-03-10 NOTE — HISTORY OF PRESENT ILLNESS
[FreeTextEntry1] : \par \par 42 y/o male with h/o DM, hl, epilepsy, overweight who presents for initial evaluation of cp\par \par notes new cp/left arm pain for past month\par \par no sob at rest, lh, syncope, palpitations, edema, orthopnea, pnd\par notes some jeong w stairs for few years\par \par \par started by pcp on omeprazole for gerd 2 weeks ago with resolution of pain\par \par diagnosed with type 2 DM 2008 - Dr. Kerns\par started on lipitor for a few years\par \par does walking for exercise\par eats healthy \par \par was on losartan in past\par \par PMH/PSH:\par DM\par HL\par seizure d/o\par goiter\par drug related hair loss\par overweight\par s/p appy\par s/p tonsillectomy\par \par \par MEDS:\par lipitor 20 mg qhs\par ozempic \par jardiance 10 mg qd\par keppra 1000 mg bid\par divalproex 1250 mg qd\par fish oil\par centrum\par vit D 3\par \par \par ALL:\par nkda\par \par \par SH:\par tobacco use 1 cig few times/week since teens\par rare etoh\par no drugs\par consultant\par from [place]\par lived [place] since 1998\par \par daughter - alive, 8, healthy\par \par \par FH:\par mother - alive, dm, 68\par father - alive, dm, 70\par brother - alive, 41, gerd\par

## 2022-03-10 NOTE — PHYSICAL EXAM
[Well Developed] : well developed [Well Nourished] : well nourished [No Acute Distress] : no acute distress [Normal Conjunctiva] : normal conjunctiva [Normal Venous Pressure] : normal venous pressure [Normal S1, S2] : normal S1, S2 [No Carotid Bruit] : no carotid bruit [No Murmur] : no murmur [No Rub] : no rub [No Gallop] : no gallop [Clear Lung Fields] : clear lung fields [Good Air Entry] : good air entry [No Respiratory Distress] : no respiratory distress  [Soft] : abdomen soft [Non Tender] : non-tender [No Masses/organomegaly] : no masses/organomegaly [Normal Bowel Sounds] : normal bowel sounds [Normal Gait] : normal gait [No Edema] : no edema [No Cyanosis] : no cyanosis [No Clubbing] : no clubbing [No Varicosities] : no varicosities [No Rash] : no rash [No Skin Lesions] : no skin lesions [Moves all extremities] : moves all extremities [Normal Speech] : normal speech [No Focal Deficits] : no focal deficits [Alert and Oriented] : alert and oriented [Normal memory] : normal memory

## 2022-04-07 ENCOUNTER — APPOINTMENT (OUTPATIENT)
Dept: HEART AND VASCULAR | Facility: CLINIC | Age: 42
End: 2022-04-07
Payer: COMMERCIAL

## 2022-04-07 VITALS
DIASTOLIC BLOOD PRESSURE: 86 MMHG | TEMPERATURE: 97.8 F | OXYGEN SATURATION: 98 % | HEART RATE: 87 BPM | HEIGHT: 66 IN | SYSTOLIC BLOOD PRESSURE: 134 MMHG | WEIGHT: 164 LBS | BODY MASS INDEX: 26.36 KG/M2

## 2022-04-07 PROCEDURE — 93306 TTE W/DOPPLER COMPLETE: CPT

## 2022-04-07 PROCEDURE — 99214 OFFICE O/P EST MOD 30 MIN: CPT | Mod: 25

## 2022-04-08 ENCOUNTER — APPOINTMENT (OUTPATIENT)
Dept: FAMILY MEDICINE | Facility: CLINIC | Age: 42
End: 2022-04-08
Payer: COMMERCIAL

## 2022-04-08 DIAGNOSIS — K21.9 GASTRO-ESOPHAGEAL REFLUX DISEASE W/OUT ESOPHAGITIS: ICD-10-CM

## 2022-04-08 PROCEDURE — 99213 OFFICE O/P EST LOW 20 MIN: CPT | Mod: 95

## 2022-04-08 NOTE — DISCUSSION/SUMMARY
[Patient] : the patient [___ Week(s)] : in [unfilled] week(s) [FreeTextEntry1] : 40 y/o male with h/o DM, hl, epilepsy, overweight, smoker, htn who presents for f/up today\par \par -ekg 2/22 reviewed\par -labs 2022 reviewed\par -start losartan 25 mg qd\par -ordered viviana w doppler\par -ordered secondary w/up htn\par -counseled on cvd risk factors\par -continue statin\par -CTA ordered cp\par -Echocardiogram ordered today\par 1. Normal mitral valve. No mitral valve regurgitation seen.\par 2. Normal trileaflet aortic valve. No aortic valve regurgitation seen.\par 3. Normal left ventricular systolic function with an LVEF of 60%.  No segmental wall motion abnormalities.\par 4. Normal right ventricular size and function.\par 5. Normal pericardium with no pericardial effusion.\par \par -continue on smoking cessation\par -continue asa 81 mg qd\par -f/up with endo - continue dm management\par -neuro f/up for seizures\par -f/up 3 weeks for htn\par \par -I have spent 30 minutes reviewing labs, records, tests and discussing cvd risk factors, cp/sob evaluation, htn management. \par \par

## 2022-04-08 NOTE — HISTORY OF PRESENT ILLNESS
[FreeTextEntry1] : 40 y/o male with h/o DM, hl, epilepsy, overweight, htn who presents for f/up today\par \par last seen 3/22\par started on asa \par CTA ordered - not done  yet\par Echo pending today - \par \par notes new cp/left arm pain for past month\par no sob at rest, lh, syncope, palpitations, edema, orthopnea, pnd\par notes some jeong w stairs for few years\par \par quit smoking last month\par \par started by pcp on omeprazole for gerd with resolution of pain\par \par diagnosed with type 2 DM 2008 - Dr. 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

## 2022-04-14 PROBLEM — K21.9 MILD ACID REFLUX: Status: RESOLVED | Noted: 2022-02-18 | Resolved: 2022-04-14

## 2022-04-14 RX ORDER — OMEPRAZOLE 40 MG/1
40 CAPSULE, DELAYED RELEASE ORAL
Qty: 30 | Refills: 1 | Status: DISCONTINUED | COMMUNITY
Start: 2022-02-18 | End: 2022-04-14

## 2022-04-14 NOTE — HISTORY OF PRESENT ILLNESS
[Home] : at home, [unfilled] , at the time of the visit. [Medical Office: ([facility])___] : at the medical office located in  [Verbal consent obtained from patient] : the patient, [unfilled] [FreeTextEntry1] : acid reflux follow up  [de-identified] : 42 yo m presents to discuss acid reflux. \par Mentioned altered diet-- cut down on spicy foods, late night snacking, no alcohol. \par Finished course of PPI. \par No longer with gerd/ acid reflux symptoms.\par Follow up pending with cardio  \par

## 2022-04-14 NOTE — PLAN
[FreeTextEntry1] : acid reflux \par symptoms resolved\par will stop PPI \par cont with gerd friendly diet and lifestyle \par will follow up for cpe \par pending visit with cardio \par

## 2022-04-25 ENCOUNTER — RX RENEWAL (OUTPATIENT)
Age: 42
End: 2022-04-25

## 2022-04-25 ENCOUNTER — APPOINTMENT (OUTPATIENT)
Dept: CT IMAGING | Facility: CLINIC | Age: 42
End: 2022-04-25
Payer: COMMERCIAL

## 2022-04-25 ENCOUNTER — OUTPATIENT (OUTPATIENT)
Dept: OUTPATIENT SERVICES | Facility: HOSPITAL | Age: 42
LOS: 1 days | End: 2022-04-25

## 2022-04-25 PROCEDURE — 75574 CT ANGIO HRT W/3D IMAGE: CPT | Mod: 26

## 2022-05-13 ENCOUNTER — APPOINTMENT (OUTPATIENT)
Dept: ENDOCRINOLOGY | Facility: CLINIC | Age: 42
End: 2022-05-13
Payer: COMMERCIAL

## 2022-05-13 VITALS — BODY MASS INDEX: 26.15 KG/M2 | WEIGHT: 162 LBS

## 2022-05-13 PROCEDURE — 99442: CPT

## 2022-05-13 NOTE — REASON FOR VISIT
[Home] : at home, [unfilled] , at the time of the visit. [Medical Office: ([facility])___] : at the medical office located in  [Verbal consent obtained from patient] : the patient, [unfilled] [Follow - Up] : a follow-up visit [DM Type 2] : DM Type 2

## 2022-05-13 NOTE — ASSESSMENT
[FreeTextEntry1] : Diabetes, goal A1c < 7.0%.\par continue current regimen.   \par continue statin therapy\par RTO 6 months

## 2022-05-13 NOTE — HISTORY OF PRESENT ILLNESS
[FreeTextEntry1] : Telephone visit.   Video not connecting (he also had problems connecting video for his job)\par no health issues since last visit\par starting to travel for work again, walking more at airports\par eating more fish in diet\par weight is 162 lb\par up to date with ophtho, found to have increased pressure, rx'd drops\par baby aspirin added by cardiologist\par labs reviewed from 2/22: A1c 6.9\par \par Meds: \par Ozempic 1mg/week, Jardiance 10mg\par atrovastatin 10mg, aspirin 81mg\par [id number] bid, Depakote 500mg, 3 tab/day + 125mg hs\par losartan 25mg\par Previous meds: metformin, Glucophage XR (diarrhea)

## 2022-05-13 NOTE — DATA REVIEWED
[FreeTextEntry1] : 2/22: A1c 6.9%, tot chol 137, trig 140, HDL 41, LDL 72, TSH 2.43\par 11/21: A1c 7.1%, tot chol 178, HDL 43, trig 162, , urine alb/cr 7, TSH 5.99\par 12/20: A1c 6.6%, tot chol 157, HDL 42, LDL 90, trig 152, CMP normal\par 5/20: A1c 7.1%, urine microalbumin/cr 6, B12 756, TSH 2.76\par 11/19: A1c 6.3%, tot chol 137, trig 238, HDL 42, LDL 47\par 8/19: A1c 6.2%, Cr 0.92, tot chol 185, trig 143, HDL 46, , urine microalbumin 9, TSH 2.27. \par \par thyroid sono, 1/20: homogenous, normovascular\par L mid pole cyst 3mm

## 2022-06-23 ENCOUNTER — APPOINTMENT (OUTPATIENT)
Dept: FAMILY MEDICINE | Facility: CLINIC | Age: 42
End: 2022-06-23
Payer: COMMERCIAL

## 2022-06-23 DIAGNOSIS — U07.1 COVID-19: ICD-10-CM

## 2022-06-23 PROCEDURE — 99213 OFFICE O/P EST LOW 20 MIN: CPT | Mod: 95

## 2022-06-29 PROBLEM — U07.1 COVID-19: Status: RESOLVED | Noted: 2022-06-29 | Resolved: 2022-06-29

## 2022-06-29 NOTE — PLAN
[FreeTextEntry1] : completely resolved \par interested in he needed an additional booster or antiviral or mab, was reassured he did not \par encouraged masking, hygiene, social distancing \par will return to care if symptoms return or change

## 2022-06-29 NOTE — HISTORY OF PRESENT ILLNESS
[Home] : at home, [unfilled] , at the time of the visit. [Medical Office: ([facility])___] : at the medical office located in  [Verbal consent obtained from patient] : the patient, [unfilled] [FreeTextEntry8] : cc: covid hx \par \par 42 yo m presents covid pos 6/3-6/13.\par Had nausea, vomiting, fatigue, diarrhea. \par Still continuing to mask, avoiding public locations. \par Feels completely better. \par Still testing positive.

## 2022-07-07 ENCOUNTER — RX RENEWAL (OUTPATIENT)
Age: 42
End: 2022-07-07

## 2022-08-17 ENCOUNTER — RX RENEWAL (OUTPATIENT)
Age: 42
End: 2022-08-17

## 2022-09-02 ENCOUNTER — APPOINTMENT (OUTPATIENT)
Dept: FAMILY MEDICINE | Facility: CLINIC | Age: 42
End: 2022-09-02

## 2022-09-02 VITALS
HEIGHT: 66 IN | HEART RATE: 93 BPM | WEIGHT: 167.25 LBS | DIASTOLIC BLOOD PRESSURE: 82 MMHG | TEMPERATURE: 97.8 F | SYSTOLIC BLOOD PRESSURE: 123 MMHG | BODY MASS INDEX: 26.88 KG/M2 | OXYGEN SATURATION: 96 %

## 2022-09-02 DIAGNOSIS — Z87.898 PERSONAL HISTORY OF OTHER SPECIFIED CONDITIONS: ICD-10-CM

## 2022-09-02 DIAGNOSIS — Z00.00 ENCOUNTER FOR GENERAL ADULT MEDICAL EXAMINATION W/OUT ABNORMAL FINDINGS: ICD-10-CM

## 2022-09-02 DIAGNOSIS — G40.909 EPILEPSY, UNSPECIFIED, NOT INTRACTABLE, W/OUT STATUS EPILEPTICUS: ICD-10-CM

## 2022-09-02 PROCEDURE — 36415 COLL VENOUS BLD VENIPUNCTURE: CPT

## 2022-09-02 PROCEDURE — 99396 PREV VISIT EST AGE 40-64: CPT | Mod: 25

## 2022-09-02 RX ORDER — MIDAZOLAM 5 MG/.1ML
5 SPRAY NASAL
Qty: 1 | Refills: 0 | Status: DISCONTINUED | COMMUNITY
Start: 2021-10-22 | End: 2022-09-02

## 2022-09-02 RX ORDER — CLONAZEPAM 0.25 MG/1
0.25 TABLET, ORALLY DISINTEGRATING ORAL
Qty: 12 | Refills: 0 | Status: DISCONTINUED | COMMUNITY
Start: 2021-10-22 | End: 2022-09-02

## 2022-09-02 RX ORDER — DIVALPROEX SODIUM 250 MG/1
250 TABLET, EXTENDED RELEASE ORAL
Qty: 30 | Refills: 1 | Status: DISCONTINUED | COMMUNITY
Start: 2021-10-22 | End: 2022-09-02

## 2022-09-02 NOTE — HEALTH RISK ASSESSMENT
[Good] : ~his/her~  mood as  good [Former] : Former [Yes] : Yes [Monthly or less (1 pt)] : Monthly or less (1 point) [1 or 2 (0 pts)] : 1 or 2 (0 points) [Never (0 pts)] : Never (0 points) [No] : In the past 12 months have you used drugs other than those required for medical reasons? No [0] : 2) Feeling down, depressed, or hopeless: Not at all (0) [PHQ-2 Negative - No further assessment needed] : PHQ-2 Negative - No further assessment needed [HIV test declined] : HIV test declined [Hepatitis C test declined] : Hepatitis C test declined [None] : None [Alone] : lives alone [Employed] : employed [Single] : single [Sexually Active] : sexually active [Feels Safe at Home] : Feels safe at home [Fully functional (bathing, dressing, toileting, transferring, walking, feeding)] : Fully functional (bathing, dressing, toileting, transferring, walking, feeding) [Fully functional (using the telephone, shopping, preparing meals, housekeeping, doing laundry, using] : Fully functional and needs no help or supervision to perform IADLs (using the telephone, shopping, preparing meals, housekeeping, doing laundry, using transportation, managing medications and managing finances) [Audit-CScore] : 1 [de-identified] : walking [de-identified] : fruits, veggies  [[id number]] : 0 [Change in mental status noted] : No change in mental status noted [Language] : denies difficulty with language [High Risk Behavior] : no high risk behavior [Reports changes in hearing] : Reports no changes in hearing [Reports changes in vision] : Reports no changes in vision [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

## 2022-09-02 NOTE — HISTORY OF PRESENT ILLNESS
[FreeTextEntry1] : annual  [de-identified] : 40 yo m presents for annual. \par No complaints today. \par

## 2022-09-06 LAB
25(OH)D3 SERPL-MCNC: 42.6 NG/ML
ALBUMIN SERPL ELPH-MCNC: 4.6 G/DL
ALP BLD-CCNC: 44 U/L
ALT SERPL-CCNC: 19 U/L
ANION GAP SERPL CALC-SCNC: 18 MMOL/L
AST SERPL-CCNC: 17 U/L
BASOPHILS # BLD AUTO: 0.02 K/UL
BASOPHILS NFR BLD AUTO: 0.2 %
BILIRUB SERPL-MCNC: 0.4 MG/DL
BUN SERPL-MCNC: 14 MG/DL
CALCIUM SERPL-MCNC: 9.4 MG/DL
CHLORIDE SERPL-SCNC: 102 MMOL/L
CHOLEST SERPL-MCNC: 193 MG/DL
CO2 SERPL-SCNC: 23 MMOL/L
CREAT SERPL-MCNC: 1.05 MG/DL
EGFR: 90 ML/MIN/1.73M2
EOSINOPHIL # BLD AUTO: 0.25 K/UL
EOSINOPHIL NFR BLD AUTO: 2.9 %
ESTIMATED AVERAGE GLUCOSE: 194 MG/DL
GLUCOSE SERPL-MCNC: 169 MG/DL
HBA1C MFR BLD HPLC: 8.4 %
HCT VFR BLD CALC: 50.3 %
HDLC SERPL-MCNC: 46 MG/DL
HGB BLD-MCNC: 16.3 G/DL
IMM GRANULOCYTES NFR BLD AUTO: 0.2 %
LDLC SERPL CALC-MCNC: 109 MG/DL
LYMPHOCYTES # BLD AUTO: 4.66 K/UL
LYMPHOCYTES NFR BLD AUTO: 53.1 %
MAN DIFF?: NORMAL
MCHC RBC-ENTMCNC: 29.7 PG
MCHC RBC-ENTMCNC: 32.4 GM/DL
MCV RBC AUTO: 91.8 FL
MONOCYTES # BLD AUTO: 0.62 K/UL
MONOCYTES NFR BLD AUTO: 7.1 %
NEUTROPHILS # BLD AUTO: 3.2 K/UL
NEUTROPHILS NFR BLD AUTO: 36.5 %
NONHDLC SERPL-MCNC: 147 MG/DL
PLATELET # BLD AUTO: 225 K/UL
POTASSIUM SERPL-SCNC: 4.7 MMOL/L
PROT SERPL-MCNC: 7.2 G/DL
RBC # BLD: 5.48 M/UL
RBC # FLD: 13.1 %
SODIUM SERPL-SCNC: 143 MMOL/L
TRIGL SERPL-MCNC: 186 MG/DL
TSH SERPL-ACNC: 1.85 UIU/ML
WBC # FLD AUTO: 8.77 K/UL

## 2022-09-19 ENCOUNTER — APPOINTMENT (OUTPATIENT)
Dept: FAMILY MEDICINE | Facility: CLINIC | Age: 42
End: 2022-09-19

## 2022-09-19 PROCEDURE — 99214 OFFICE O/P EST MOD 30 MIN: CPT | Mod: 95

## 2022-09-19 NOTE — PLAN
[FreeTextEntry1] : reviewed labs \par \par abnormal cbc\par repeat in 3 months \par may be post covid changes \par \par diabetes \par a1c now 8.4 from 6.9 \par compliant with meds aside from ozempic \par noncompliant with diet \par has not been exercising \par encouraged diet and exercise \par will repeat in 3 months\par \par cholesterol \par elevated, high risk score, likely related to a1c \par encouraged diet, exercise \par compliant with meds \par will repeat in 3 months\par \par follow up appt. in 3 months  \par

## 2022-09-19 NOTE — HISTORY OF PRESENT ILLNESS
[Home] : at home, [unfilled] , at the time of the visit. [Medical Office: ([facility])___] : at the medical office located in  [Verbal consent obtained from patient] : the patient, [unfilled] [FreeTextEntry1] : lab review \par  [de-identified] : 42 yo m presents to discuss his labs. \par Abnormal cbc, abnormal sugars, abnormal cholesterol. \par

## 2022-10-24 ENCOUNTER — RX RENEWAL (OUTPATIENT)
Age: 42
End: 2022-10-24

## 2022-10-28 ENCOUNTER — APPOINTMENT (OUTPATIENT)
Dept: NEUROLOGY | Facility: CLINIC | Age: 42
End: 2022-10-28

## 2022-10-28 VITALS
SYSTOLIC BLOOD PRESSURE: 110 MMHG | WEIGHT: 162 LBS | HEART RATE: 89 BPM | BODY MASS INDEX: 26.03 KG/M2 | DIASTOLIC BLOOD PRESSURE: 70 MMHG | TEMPERATURE: 96.3 F | OXYGEN SATURATION: 99 % | HEIGHT: 66 IN

## 2022-10-28 PROCEDURE — 99214 OFFICE O/P EST MOD 30 MIN: CPT

## 2022-10-28 RX ORDER — LOSARTAN POTASSIUM 25 MG/1
25 TABLET, FILM COATED ORAL DAILY
Qty: 30 | Refills: 3 | Status: DISCONTINUED | COMMUNITY
Start: 2022-04-07 | End: 2022-10-28

## 2022-10-30 LAB
ALBUMIN SERPL ELPH-MCNC: 4.4 G/DL
ALP BLD-CCNC: 48 U/L
ALT SERPL-CCNC: 12 U/L
ANION GAP SERPL CALC-SCNC: 12 MMOL/L
AST SERPL-CCNC: 15 U/L
BASOPHILS # BLD AUTO: 0.04 K/UL
BASOPHILS NFR BLD AUTO: 0.5 %
BILIRUB SERPL-MCNC: 0.5 MG/DL
BUN SERPL-MCNC: 14 MG/DL
CALCIUM SERPL-MCNC: 9.6 MG/DL
CHLORIDE SERPL-SCNC: 100 MMOL/L
CO2 SERPL-SCNC: 28 MMOL/L
CREAT SERPL-MCNC: 0.99 MG/DL
EGFR: 96 ML/MIN/1.73M2
EOSINOPHIL # BLD AUTO: 0.35 K/UL
EOSINOPHIL NFR BLD AUTO: 4.3 %
GLUCOSE SERPL-MCNC: 180 MG/DL
HCT VFR BLD CALC: 49.7 %
HGB BLD-MCNC: 16.4 G/DL
IMM GRANULOCYTES NFR BLD AUTO: 0.2 %
LYMPHOCYTES # BLD AUTO: 3.43 K/UL
LYMPHOCYTES NFR BLD AUTO: 41.9 %
MAN DIFF?: NORMAL
MCHC RBC-ENTMCNC: 30.5 PG
MCHC RBC-ENTMCNC: 33 GM/DL
MCV RBC AUTO: 92.6 FL
MONOCYTES # BLD AUTO: 0.59 K/UL
MONOCYTES NFR BLD AUTO: 7.2 %
NEUTROPHILS # BLD AUTO: 3.75 K/UL
NEUTROPHILS NFR BLD AUTO: 45.9 %
PLATELET # BLD AUTO: 204 K/UL
POTASSIUM SERPL-SCNC: 5 MMOL/L
PROT SERPL-MCNC: 6.8 G/DL
RBC # BLD: 5.37 M/UL
RBC # FLD: 12.6 %
SODIUM SERPL-SCNC: 139 MMOL/L
VALPROATE SERPL-MCNC: 109 UG/ML
WBC # FLD AUTO: 8.18 K/UL

## 2022-11-01 LAB — LEVETIRACETAM SERPL-MCNC: 49 UG/ML

## 2022-11-10 ENCOUNTER — APPOINTMENT (OUTPATIENT)
Dept: NEUROLOGY | Facility: CLINIC | Age: 42
End: 2022-11-10

## 2022-11-10 PROCEDURE — 95819 EEG AWAKE AND ASLEEP: CPT

## 2022-11-11 ENCOUNTER — APPOINTMENT (OUTPATIENT)
Dept: NEUROLOGY | Facility: CLINIC | Age: 42
End: 2022-11-11

## 2022-11-11 PROCEDURE — 95700 EEG CONT REC W/VID EEG TECH: CPT

## 2022-11-11 PROCEDURE — 95708 EEG WO VID EA 12-26HR UNMNTR: CPT

## 2022-11-11 PROCEDURE — 95719 EEG PHYS/QHP EA INCR W/O VID: CPT

## 2022-11-14 NOTE — DISCUSSION/SUMMARY
[FreeTextEntry1] : Impression:\par 1) history of epilepsy,  seizure free since 2015, but on high dose dual therapy, and may not need to be on both at high doses, now 7 years seizure-free.  The most recent seizure in 2014 came with LEV dropping to zero, per notes, but unclear for how long and whether the taper was gradual, and what dose depakote was at the time.  Was to start tapering down on depakote with good EEGs, but was confused and actually increased the dose by 125 instead of shaving it down.\par 2) hair thinning - possible adverse effects of depakote, so will be interested in seeing if we can limit the dose.\par \par Plan:\par 1) will now start reduction in depakote by 125mg\par 2) reeg / ambEEG, and if clear will continue to drop dose.\par 3) clonazepam PRN auras\par 4) nayzilam PRN convulsions\par 5) RTC in 3-4 months.\par

## 2022-11-14 NOTE — PHYSICAL EXAM
[FreeTextEntry1] : General:\par Constitutional:  Sitting comfortably in NAD.\par Psychiatric: well-groomed, appropriate affect\par Ears, Nose, Throat: no abnormalities, mucus membranes moist\par Neck: supple\par Extremities: no edema, clubbing or cyanosis\par Skin: no rash or neuro-cutaneous signs \par \par Cognitive:\par Orientation, language, memory and knowledge screens intact.\par \par Cranial Nerves:\par II: DANIELLE. III/IV/VI: EOM Full.  VII: Face appears symmetric VIII: Normal to screening\par IX/X: normal phonation  XI: Trapezius Symmetric  XII: Tongue midline\par Motor:\par Power: no pronator drift\par \par Narrow based gait\par \par \par \par

## 2022-11-14 NOTE — HISTORY OF PRESENT ILLNESS
[FreeTextEntry1] : \par Rickey is a 43 yo man moved to [place] from [place], seen in f/u today, prior visit was Oct 22 ,2021.\par \par Overall, reports that has been doing well and this last year has been uneventful. Last seizure was prior to the pandemic in 2019. Has been working out and eating well in order to controlhis diabetes.,\par \par VPA on 3x 500mg ER and 125mg DR at night. Patient is wondering why the 125mg DR was started and is inquiring about the possibility to remove/taper.\par Lev [id number] bid\par \par Side effects-- Hair loss is progressively worsening. He states he considered getting a hair transplant, but is too expensive. At this point is just "shaving it all off," tired of dealing with it.\par \par Additionally, 1x/week he wakes up in the middle of the night around 2am. It takes him several hours to go backto sleep,falling asleep around 4am. He states it impacts his work day onthose days since he has to go to work at 7am.\par \par He has been following with a dermatologist due to a migratory skiin infection he has beenexperiencing. Dermatologist ruled it as MRSA, but the treatments have not been successful in stopping progresing. Skin biopsy will be done in 1 month.\par \par VPA was 111 on 3x 500mg ERu s\par LEV 53.8 on 1000x2 bid.\par \par Jun/21: MRI brain seizure-protocol was negative for abnormalities.\par rEEG Jun 25, 2021: negative\par \par \par Prior history: Notes from 2018 showed his LEV was 2000mg, bid \par Onset was in juvenile age group, age 8, per notes.  He recalls being in his living room, he felt frozen and could not turn his head, then dropped, found by family members.\par MRI was negative, EEG showed abnormalities but not specific.\par \par 2015 moved to [facility] from [place], followed at [facility] then, but transferred to Dr. Soto at [facility].    Most recent seizure was in 2014, that occurred while attempting to stop levetiracetam completely, per notes.\par \par He is noted to have irritability in the morning, but his wife has known him since he was on LEV.\par Depakote is well-tolerated, though has noted hair thinning gradually since 2009-10.\par \par Supplements include fish oil and multivitamins.\par \par Diabetic since 2007, runs on both side of the family.

## 2022-11-23 ENCOUNTER — TRANSCRIPTION ENCOUNTER (OUTPATIENT)
Age: 42
End: 2022-11-23

## 2022-11-28 ENCOUNTER — RX RENEWAL (OUTPATIENT)
Age: 42
End: 2022-11-28

## 2022-12-13 ENCOUNTER — APPOINTMENT (OUTPATIENT)
Dept: FAMILY MEDICINE | Facility: CLINIC | Age: 42
End: 2022-12-13
Payer: COMMERCIAL

## 2022-12-13 PROCEDURE — 36415 COLL VENOUS BLD VENIPUNCTURE: CPT

## 2022-12-14 LAB
ALBUMIN SERPL ELPH-MCNC: 4.1 G/DL
ALP BLD-CCNC: 37 U/L
ALT SERPL-CCNC: 7 U/L
ANION GAP SERPL CALC-SCNC: 11 MMOL/L
AST SERPL-CCNC: 12 U/L
BILIRUB SERPL-MCNC: 0.4 MG/DL
BUN SERPL-MCNC: 21 MG/DL
CALCIUM SERPL-MCNC: 9.6 MG/DL
CHLORIDE SERPL-SCNC: 102 MMOL/L
CHOLEST SERPL-MCNC: 144 MG/DL
CO2 SERPL-SCNC: 27 MMOL/L
CREAT SERPL-MCNC: 1.11 MG/DL
EGFR: 84 ML/MIN/1.73M2
ESTIMATED AVERAGE GLUCOSE: 137 MG/DL
GLUCOSE SERPL-MCNC: 105 MG/DL
HBA1C MFR BLD HPLC: 6.4 %
HDLC SERPL-MCNC: 44 MG/DL
LDLC SERPL CALC-MCNC: 80 MG/DL
NONHDLC SERPL-MCNC: 100 MG/DL
POTASSIUM SERPL-SCNC: 4.6 MMOL/L
PROT SERPL-MCNC: 7 G/DL
SODIUM SERPL-SCNC: 140 MMOL/L
TRIGL SERPL-MCNC: 100 MG/DL

## 2022-12-22 ENCOUNTER — APPOINTMENT (OUTPATIENT)
Dept: FAMILY MEDICINE | Facility: CLINIC | Age: 42
End: 2022-12-22

## 2022-12-22 PROCEDURE — 99214 OFFICE O/P EST MOD 30 MIN: CPT | Mod: 95

## 2022-12-22 NOTE — PLAN
[FreeTextEntry1] : diabetes, controlled \par reviewed a1c, improved-- 8.4 to 6.4 \par diet, exercise, med compliance encouraged\par patient to keep up the good work \par \par high cholesterol \par controlled, ldl improved, reviewed labs with patient  \par ascvd risk improved \par diet, exercise, med compliance encouraged\par patient to keep up the good work

## 2022-12-22 NOTE — HISTORY OF PRESENT ILLNESS
[Home] : at home, [unfilled] , at the time of the visit. [Medical Office: ([facility])___] : at the medical office located in  [Verbal consent obtained from patient] : the patient, [unfilled] [FreeTextEntry1] : labs review  [de-identified] : 40 yo m presents to discuss his labs. \par Abnormal cbc, abnormal sugars, abnormal cholesterol hx. \par Labs were recently drawn, here to review. \par

## 2023-01-03 ENCOUNTER — RX RENEWAL (OUTPATIENT)
Age: 43
End: 2023-01-03

## 2023-01-09 ENCOUNTER — LABORATORY RESULT (OUTPATIENT)
Age: 43
End: 2023-01-09

## 2023-01-10 ENCOUNTER — APPOINTMENT (OUTPATIENT)
Dept: DERMATOLOGY | Facility: CLINIC | Age: 43
End: 2023-01-10
Payer: COMMERCIAL

## 2023-01-10 ENCOUNTER — NON-APPOINTMENT (OUTPATIENT)
Age: 43
End: 2023-01-10

## 2023-01-10 DIAGNOSIS — L81.0 POSTINFLAMMATORY HYPERPIGMENTATION: ICD-10-CM

## 2023-01-10 DIAGNOSIS — B35.4 TINEA CORPORIS: ICD-10-CM

## 2023-01-10 PROCEDURE — 99203 OFFICE O/P NEW LOW 30 MIN: CPT

## 2023-01-10 RX ORDER — KETOCONAZOLE 20.5 MG/ML
2 SHAMPOO, SUSPENSION TOPICAL
Qty: 1 | Refills: 11 | Status: ACTIVE | COMMUNITY
Start: 2023-01-10 | End: 1900-01-01

## 2023-01-10 NOTE — PHYSICAL EXAM
[Alert] : alert [Oriented x 3] : ~L oriented x 3 [FreeTextEntry3] : Focused exam performed. Findings notable for:\par Hyperpigmented annular patches on R posterior neck and R hand\par Toenails appear normal

## 2023-01-10 NOTE — ASSESSMENT
[External notes review: [ enter provider(s) name(s) ] :____] : As part of my evaluation, I have reviewed prior clinical note(s) from provider(s) outside of my group practice. The name(s) are: [unfilled] [Review of test: [ enter lab tests ] :____] : I reviewed the following test results: [unfilled] [FreeTextEntry1] : 1. Tinea corporis, extensive\par - S/p terbinafine with resolution\par - Not active on exam today\par - Ketoconazole 2% shampoo once weekly to prevent recurrence; may stop if no rash for 6 months\par - Will assess toenails for dermatophyte to determine if involved and responsible for extensive rash\par - Nail clipping sent for PAS\par \par 2. PIH\par - Will resolve with time\par - Photoprotection reviewed\par \par RTC pending PAS

## 2023-01-10 NOTE — HISTORY OF PRESENT ILLNESS
[FreeTextEntry1] : Rash - NPA [de-identified] : PCP: BONNIE GUILLEN\par \par LINDSEY BLACK is a 42 year M who presents for evaluation of:\par \par # Rash\par Onset: Sept 2022\par Location: Chin/neck, back of neck, R hand\par Symptoms: Itchy\par Previous treatments and response: Seen by outside derm. Initially tx with antibiotics (cx of lesion on cheek +MRSA) as well as topical steroids and antifungals. At last appointment in Dec dx with tinea and treated with 1 month of terbinafine with resolution. Now with hyperpigmentation.\par Hx of DM, controlled A1c 6.4\par No pets at home\par \par

## 2023-01-20 ENCOUNTER — TRANSCRIPTION ENCOUNTER (OUTPATIENT)
Age: 43
End: 2023-01-20

## 2023-01-26 ENCOUNTER — APPOINTMENT (OUTPATIENT)
Dept: NEUROLOGY | Facility: CLINIC | Age: 43
End: 2023-01-26
Payer: COMMERCIAL

## 2023-01-26 PROCEDURE — 99214 OFFICE O/P EST MOD 30 MIN: CPT | Mod: 95

## 2023-01-26 NOTE — REASON FOR VISIT
[Home] : at home, [unfilled] , at the time of the visit. [Medical Office: ([facility])___] : at the medical office located in  [Patient] : the patient [Follow-Up: _____] : a [unfilled] follow-up visit

## 2023-01-26 NOTE — DISCUSSION/SUMMARY
[FreeTextEntry1] : Impression:\par 1) epilepsy, though seizure-free on dual therapy, but doses/levels high for both VPA and LEV in the past, though with recent VPA wean likely in the high normal range now.  He believes the LEV was pushed to 4000mg/d but not because he was still having seizures at 3000, but to provide that safety margin.  \par 2) fatigue/grogginess - morning is dififuclt for him, possibly related to high doses/levels of medicaiton and will like to decrease carefully over time.\par \par Plan:\par Goal to stay on dual therapy but on more reasonable doses of 2 medications.  First VPA from 1125 to 750mg/d, then LEV from 2000mg bid to 1500mg bid by end of year.\par Now: VPA to 1000mg  \par Lab test in mid February - VPA level and LEV level\par rEEG in mid-February\par \par If all well:  March 1: VPA to 500+3x125=875mg,  \par rEEG/ambEEG in mid March and if well\par April 1 VPA to 500mg+250mg\par \par May have [facility] do check in televisits with him between these steps to provide reports, new orders and directions

## 2023-01-26 NOTE — HISTORY OF PRESENT ILLNESS
[FreeTextEntry1] : \par Rickey is a 41 yo man moved to [place] from [place], seen in f/u today, prior visit was Oct 22 ,2021.\par \par \par Keep keppra is at 2000mg bid, the same, but now the VPA ER is dropped to 500mg x 2 + 125mg.\par We awakens in the mornings feeling groggy, no matter how much he slept.  He never feels rested.\par \par \par Overall, reports that has been doing well and this last year has been uneventful. Last seizure was prior to the pandemic in 2019. Has been working out and eating well in order to controlhis diabetes.,\par \par VPA on 3x 500mg ER and 125mg DR at night. Patient is wondering why the 125mg DR was started and is inquiring about the possibility to remove/taper.\par Lev [id number] bid\par \par Side effects-- Hair loss is progressively worsening. He states he considered getting a hair transplant, but is too expensive. At this point is just "shaving it all off," tired of dealing with it.\par \par Additionally, 1x/week he wakes up in the middle of the night around 2am. It takes him several hours to go backto sleep,falling asleep around 4am. 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 Dermatologist ruled it as MRSA, but the treatments have not been successful in stopping progresing. Skin biopsy will be done in 1 month.\par \par VPA was 111 on 3x 500mg ERu s\par LEV 53.8 on 1000x2 bid.\par \par Jun/21: MRI brain seizure-protocol was negative for abnormalities.\par rEEG Jun 25, 2021: negative\par \par \par Prior history: Notes from 2018 showed his LEV was 2000mg, bid \par Onset was in juvenile age group, age 8, per notes.  He recalls being in his living room, he felt frozen and could not turn his head, then dropped, found by family members.\par MRI was negative, EEG showed abnormalities but not specific.\par \par 2015 moved to [facility] from [place], followed at [facility] then, but transferred to Dr. Soto at [facility]    Most recent seizure was in 2014, that occurred while attempting to stop levetiracetam completely, per notes.\par \par He is noted to have irritability in the morning, but his wife has known him since he was on LEV.\par Depakote is well-tolerated, though has noted hair thinning gradually since 2009-10.\par \par Supplements include fish oil and multivitamins.\par \par Diabetic since 2007, runs on both side of the family.

## 2023-01-30 ENCOUNTER — APPOINTMENT (OUTPATIENT)
Dept: NEUROLOGY | Facility: CLINIC | Age: 43
End: 2023-01-30
Payer: COMMERCIAL

## 2023-01-30 LAB
ALBUMIN SERPL ELPH-MCNC: 4.6 G/DL
ALP BLD-CCNC: 54 U/L
ALT SERPL-CCNC: 16 U/L
ANION GAP SERPL CALC-SCNC: 11 MMOL/L
AST SERPL-CCNC: 13 U/L
BASOPHILS # BLD AUTO: 0.04 K/UL
BASOPHILS NFR BLD AUTO: 0.4 %
BILIRUB SERPL-MCNC: 0.4 MG/DL
BUN SERPL-MCNC: 12 MG/DL
CALCIUM SERPL-MCNC: 10.1 MG/DL
CHLORIDE SERPL-SCNC: 101 MMOL/L
CO2 SERPL-SCNC: 29 MMOL/L
CREAT SERPL-MCNC: 0.94 MG/DL
EGFR: 103 ML/MIN/1.73M2
EOSINOPHIL # BLD AUTO: 0.26 K/UL
EOSINOPHIL NFR BLD AUTO: 2.9 %
GLUCOSE SERPL-MCNC: 125 MG/DL
HCT VFR BLD CALC: 49 %
HGB BLD-MCNC: 16.3 G/DL
IMM GRANULOCYTES NFR BLD AUTO: 0.3 %
LYMPHOCYTES # BLD AUTO: 3.72 K/UL
LYMPHOCYTES NFR BLD AUTO: 41.1 %
MAN DIFF?: NORMAL
MCHC RBC-ENTMCNC: 30.2 PG
MCHC RBC-ENTMCNC: 33.3 GM/DL
MCV RBC AUTO: 90.7 FL
MONOCYTES # BLD AUTO: 0.64 K/UL
MONOCYTES NFR BLD AUTO: 7.1 %
NEUTROPHILS # BLD AUTO: 4.36 K/UL
NEUTROPHILS NFR BLD AUTO: 48.2 %
PLATELET # BLD AUTO: 255 K/UL
POTASSIUM SERPL-SCNC: 4.5 MMOL/L
PROT SERPL-MCNC: 6.9 G/DL
RBC # BLD: 5.4 M/UL
RBC # FLD: 12.5 %
SODIUM SERPL-SCNC: 142 MMOL/L
VALPROATE SERPL-MCNC: 83 UG/ML
WBC # FLD AUTO: 9.05 K/UL

## 2023-01-30 PROCEDURE — 95819 EEG AWAKE AND ASLEEP: CPT

## 2023-02-13 ENCOUNTER — RX RENEWAL (OUTPATIENT)
Age: 43
End: 2023-02-13

## 2023-02-22 ENCOUNTER — APPOINTMENT (OUTPATIENT)
Dept: NEUROLOGY | Facility: CLINIC | Age: 43
End: 2023-02-22

## 2023-03-01 ENCOUNTER — APPOINTMENT (OUTPATIENT)
Dept: NEUROLOGY | Facility: CLINIC | Age: 43
End: 2023-03-01
Payer: COMMERCIAL

## 2023-03-01 PROCEDURE — 99214 OFFICE O/P EST MOD 30 MIN: CPT | Mod: 95

## 2023-03-01 NOTE — HISTORY OF PRESENT ILLNESS
[Home] : at home, [unfilled] , at the time of the visit. [Other Location: e.g. Home (Enter Location, City,State)___] : at [unfilled] [FreeTextEntry1] : \par Rickey is a 41 yo man moved to [place] from [place], seen in f/u today, prior visit was\par \par \par \par Keppra is at 2000mg bid, the same, but now the VPA ER is dropped to 1000mg qhs\par Keppra at 70.2 (about 35% higher than the last 2 levels), VPA 83.\par We awakens in the mornings feeling groggy, no matter how much he slept.  He never feels rested.\par \par He still reports being tired.\par He slept for 8 hours last night but felt that he only slept 2 hours.\par Sugars have been controlled.  He is told he is not snoring.\par He awakens first thing in the morning feeling very tired.  He feels almost a brain fog - for 4 hours, and clears up nicely through the evening.  He may stay up to 2am.\par It may be even worse.\par \torey Has been working out and eating well in order to control his diabetes.,\par \par Side effects-- Hair loss is progressively worsening. He states he considered getting a hair transplant, but is too expensive. At this point is just "shaving it all off," tired of dealing with it.\par \par Additionally, 1x/week he wakes up in the middle of the night around 2am. It takes him several hours to go back to sleep,falling asleep around 4am. 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 Dermatologist ruled it as MRSA, but the treatments have not been successful in stopping progresing. Skin biopsy will be done in 1 month.\par \par VPA was 111 on 3x 500mg ERu s\par LEV 53.8 on 1000x2 bid.\par \par Jun/21: MRI brain seizure-protocol was negative for abnormalities.\par rEEG Jun 25, 2021: negative\par \par \par Prior history: Notes from 2018 showed his LEV was 2000mg, bid \par Onset was in juvenile age group, age 8, per notes.  He recalls being in his living room, he felt frozen and could not turn his head, then dropped, found by family members.\par MRI was negative, EEG showed abnormalities but not specific.\par \par 2015 moved to [facility] from [place], followed at [facility] then, but transferred to Dr. Soto at [facility].    Most recent seizure was in 2014, that occurred while attempting to stop levetiracetam completely, per notes.\par \par He is noted to have irritability in the morning, but his wife has known him since he was on LEV.\par Depakote is well-tolerated, though has noted hair thinning gradually since 2009-10.\par \par Supplements include fish oil and multivitamins.\par \par Diabetic since 2007, runs on both side of the family.

## 2023-03-01 NOTE — DISCUSSION/SUMMARY
[FreeTextEntry1] : Impression:\par 1) epilepsy, though seizure-free on dual therapy, but doses/levels high for both VPA and LEV in the past, though with continued VPA wean now 83, normal range.  He believes the LEV was pushed to 4000mg/d but not because he was still having seizures at 3000, but to provide that safety margin, and the level was just recently very high at 70, for no known reason it jumped about 35%, drawn at about the same time.\par 2) fatigue/grogginess - morning is difficult for him, possibly related to high doses/levels of medication, this time the LEV may be more at fault. Will like to decrease both carefully over time.\par 3) hair loss - stable.  Rate of loss may improve on lower doses of VPA.\par \par Plan:\par 1) Goal to stay on dual therapy but on more reasonable doses of 2 medications.  First VPA from 1000 to 875 and rest at 750mg/d, then LEV from 2000mg bid to 1500mg bid by end of year.\par As of March 1: VPA ER to 3x125 AM and 500 qhs =875mg,  LEV no change until new level shows continued elevation.\par rEEG in mid March and if all well\par Meet again April 1 and likely VPA to 500mg+250mg before working on LEV and with ambEEG.\par \par

## 2023-03-07 ENCOUNTER — APPOINTMENT (OUTPATIENT)
Dept: DERMATOLOGY | Facility: CLINIC | Age: 43
End: 2023-03-07
Payer: COMMERCIAL

## 2023-03-07 DIAGNOSIS — L98.9 DISORDER OF THE SKIN AND SUBCUTANEOUS TISSUE, UNSPECIFIED: ICD-10-CM

## 2023-03-07 PROCEDURE — 99213 OFFICE O/P EST LOW 20 MIN: CPT

## 2023-03-07 RX ORDER — KETOCONAZOLE 20 MG/G
2 CREAM TOPICAL
Qty: 1 | Refills: 0 | Status: ACTIVE | COMMUNITY
Start: 2023-03-07 | End: 1900-01-01

## 2023-03-07 NOTE — HISTORY OF PRESENT ILLNESS
[FreeTextEntry1] : Rash - RPA [de-identified] : # Rash\par Onset: Sept 2022\par Location: Chin/neck, back of neck, R hand\par Symptoms: Itchy\par Previous treatments and response: Seen by outside derm. Initially tx with antibiotics (cx of lesion on cheek +MRSA) as well as topical steroids and antifungals. At last appointment in Dec dx with tinea and treated with 1 month of terbinafine with resolution. Now with hyperpigmentation.\par Hx of DM, controlled A1c 6.4\par No pets at home\par Nail clipping negative\par \par Concerned because he noted an itch on his right hand and was worried about rash recurring.\par Intermittently using keto shampoo

## 2023-03-07 NOTE — PHYSICAL EXAM
[Alert] : alert [Oriented x 3] : ~L oriented x 3 [FreeTextEntry3] : Focused exam performed. Findings notable for:\par \par Faint hyperpigmented annular patches on R posterior neck and R hand\par Superficial erosion with crust on R dorsal hand

## 2023-03-07 NOTE — ASSESSMENT
[FreeTextEntry1] : # Skin erosion - favor traumatic from scratching\par - No e/o recurrent tinea infection\par - Recommend emollient such as vaseline\par - If very concerned may do trial of keto cream BID x 2 weeks\par \par RTC PRN

## 2023-03-13 ENCOUNTER — APPOINTMENT (OUTPATIENT)
Dept: DERMATOLOGY | Facility: CLINIC | Age: 43
End: 2023-03-13

## 2023-03-17 ENCOUNTER — APPOINTMENT (OUTPATIENT)
Dept: NEUROLOGY | Facility: CLINIC | Age: 43
End: 2023-03-17
Payer: COMMERCIAL

## 2023-03-17 PROCEDURE — 95819 EEG AWAKE AND ASLEEP: CPT

## 2023-03-30 ENCOUNTER — APPOINTMENT (OUTPATIENT)
Dept: NEUROLOGY | Facility: CLINIC | Age: 43
End: 2023-03-30
Payer: COMMERCIAL

## 2023-03-30 PROCEDURE — 99214 OFFICE O/P EST MOD 30 MIN: CPT | Mod: 95

## 2023-03-30 NOTE — HISTORY OF PRESENT ILLNESS
[Home] : at home, [unfilled] , at the time of the visit. [Medical Office: ([facility])___] : at the medical office located in  [Verbal consent obtained from patient] : the patient, [unfilled] [FreeTextEntry1] : \par Rickey is a 43 yo man moved to [place] from [place], seen in f/u today, prior visit was Mar 1, 2023.\par \par About 7-10d following, he was in [employer] with his daughter he suddenly developed dizziness and a gait imbalance at 11am.  It lasted 60-90 minutes then disappeared as quickly as it began.  Meds were taken about 2 hours prior.  Had not skipped breakfast etc.\par \par The next week there was a sense of tingling on the top of the head, duration was a few minutes.\par \par Keppra is at 2000mg bid, the same, but now the VPA ER is dropped to 1250 qhs\par Keppra at 70.2 (about 35% higher than the last 2 levels), VPA 83.\par We awakens in the mornings feeling groggy, no matter how much he slept.  He never feels rested.\par \par He still reports being tired.\par He slept for 8 hours last night but felt that he only slept 2 hours.\par Sugars have been controlled.  He is told he is not snoring.\par He awakens first thing in the morning feeling very tired.  He feels almost a brain fog - for 4 hours, and clears up nicely through the evening.  He may stay up to 2am.\par It may be even worse.\par \torey Has been working out and eating well in order to control his diabetes.,\par \par Side effects-- Hair loss is progressively worsening. He states he considered getting a hair transplant, but is too expensive. At this point is just "shaving it all off," tired of dealing with it.\par \par Additionally, 1x/week he wakes up in the middle of the night around 2am. It takes him several hours to go back to sleep,falling asleep around 4am. 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 Dermatologist ruled it as MRSA, but the treatments have not been successful in stopping progresing. Skin biopsy will be done in 1 month.\par \par VPA was 111 on 3x 500mg ERu s\par LEV 53.8 on 1000x2 bid.\par \par Jun/21: MRI brain seizure-protocol was negative for abnormalities.\par rEEG Jun 25, 2021: negative\par \par \par Prior history: Notes from 2018 showed his LEV was 2000mg, bid \par Onset was in juvenile age group, age 8, per notes.  He recalls being in his living room, he felt frozen and could not turn his head, then dropped, found by family members.\par MRI was negative, EEG showed abnormalities but not specific.\par \par 2015 moved to [facility] from [place], followed at [facility], but transferred to Dr. Soto at [facility].    Most recent seizure was in 2014, that occurred while attempting to stop levetiracetam completely, per notes.\par \par He is noted to have irritability in the morning, but his wife has known him since he was on LEV.\par Depakote is well-tolerated, though has noted hair thinning gradually since 2009-10.\par \par Supplements include fish oil and multivitamins.\par \par Diabetic since 2007, runs on both side of the family.

## 2023-03-30 NOTE — DISCUSSION/SUMMARY
[FreeTextEntry1] : Impression:\par 1) epilepsy, though seizure-free on dual therapy, but doses/levels high for both VPA and LEV in the past.  He believes the LEV was pushed to 4000mg/d but not because he was still having seizures at 3000, but to provide that safety margin.  Most recently a small error and ended up going back up slightly on VPA ER from 1000 to 1250, so will cut back to 1000 before dropping further.\par 2) fatigue/grogginess - morning is difficult for him, though improving, possibly related to high doses/levels of medication that are slowly coming down, and will like to decrease carefully over time.\par \par Plan:\par Goal to stay on dual therapy but on more reasonable doses of 2 medications.  \par VPA is now at 1250 qhs, return to 1000mg qhs \par If all well:  May/2023: VPA to 500+3x125=875mg  \par

## 2023-05-15 ENCOUNTER — RX RENEWAL (OUTPATIENT)
Age: 43
End: 2023-05-15

## 2023-05-22 ENCOUNTER — APPOINTMENT (OUTPATIENT)
Dept: FAMILY MEDICINE | Facility: CLINIC | Age: 43
End: 2023-05-22
Payer: COMMERCIAL

## 2023-05-22 PROCEDURE — XXXXX: CPT | Mod: 1L

## 2023-05-30 ENCOUNTER — RX RENEWAL (OUTPATIENT)
Age: 43
End: 2023-05-30

## 2023-06-12 ENCOUNTER — APPOINTMENT (OUTPATIENT)
Dept: FAMILY MEDICINE | Facility: CLINIC | Age: 43
End: 2023-06-12
Payer: COMMERCIAL

## 2023-06-12 VITALS
HEIGHT: 66 IN | WEIGHT: 166 LBS | BODY MASS INDEX: 26.68 KG/M2 | SYSTOLIC BLOOD PRESSURE: 121 MMHG | DIASTOLIC BLOOD PRESSURE: 76 MMHG | HEART RATE: 87 BPM | TEMPERATURE: 98 F | OXYGEN SATURATION: 95 %

## 2023-06-12 PROCEDURE — 36415 COLL VENOUS BLD VENIPUNCTURE: CPT

## 2023-06-12 PROCEDURE — 99214 OFFICE O/P EST MOD 30 MIN: CPT | Mod: 25

## 2023-06-12 NOTE — HISTORY OF PRESENT ILLNESS
[FreeTextEntry1] : follow up labs  [de-identified] : 42 yo m presents for labs. \par Abnormal cbc, abnormal sugars, abnormal cholesterol on recent blood work. \par Has been trying to eat well, exercise.

## 2023-06-13 ENCOUNTER — APPOINTMENT (OUTPATIENT)
Dept: FAMILY MEDICINE | Facility: CLINIC | Age: 43
End: 2023-06-13

## 2023-06-28 ENCOUNTER — APPOINTMENT (OUTPATIENT)
Dept: ENDOCRINOLOGY | Facility: CLINIC | Age: 43
End: 2023-06-28

## 2023-06-28 ENCOUNTER — NON-APPOINTMENT (OUTPATIENT)
Age: 43
End: 2023-06-28

## 2023-06-28 LAB
ALBUMIN SERPL ELPH-MCNC: 4.4 G/DL
ALP BLD-CCNC: 51 U/L
ALT SERPL-CCNC: 16 U/L
ANION GAP SERPL CALC-SCNC: 16 MMOL/L
AST SERPL-CCNC: 14 U/L
BILIRUB SERPL-MCNC: 0.3 MG/DL
BUN SERPL-MCNC: 11 MG/DL
CALCIUM SERPL-MCNC: 9.3 MG/DL
CHLORIDE SERPL-SCNC: 107 MMOL/L
CHOLEST SERPL-MCNC: 155 MG/DL
CO2 SERPL-SCNC: 23 MMOL/L
CREAT SERPL-MCNC: 1.04 MG/DL
EGFR: 91 ML/MIN/1.73M2
ESTIMATED AVERAGE GLUCOSE: 163 MG/DL
GLUCOSE SERPL-MCNC: 168 MG/DL
HBA1C MFR BLD HPLC: 7.3 %
HDLC SERPL-MCNC: 40 MG/DL
LDLC SERPL CALC-MCNC: 88 MG/DL
NONHDLC SERPL-MCNC: 116 MG/DL
POTASSIUM SERPL-SCNC: 4.5 MMOL/L
PROT SERPL-MCNC: 6.9 G/DL
SODIUM SERPL-SCNC: 145 MMOL/L
TRIGL SERPL-MCNC: 138 MG/DL

## 2023-06-29 RX ORDER — DIVALPROEX SODIUM 125 MG/1
125 TABLET, DELAYED RELEASE ORAL
Qty: 30 | Refills: 2 | Status: COMPLETED | COMMUNITY
Start: 2021-10-22 | End: 2023-06-29

## 2023-07-27 ENCOUNTER — APPOINTMENT (OUTPATIENT)
Dept: FAMILY MEDICINE | Facility: CLINIC | Age: 43
End: 2023-07-27
Payer: COMMERCIAL

## 2023-07-27 PROCEDURE — 99214 OFFICE O/P EST MOD 30 MIN: CPT | Mod: 95

## 2023-07-27 NOTE — HISTORY OF PRESENT ILLNESS
[Home] : at home, [unfilled] , at the time of the visit. [Medical Office: ([facility])___] : at the medical office located in  [Verbal consent obtained from patient] : the patient, [unfilled] [FreeTextEntry1] : review labs  [de-identified] : 41 yo m presents to discuss labs\par elevated a1c, chol from last visit \par here to review meds, discuss lifestyle \par

## 2023-07-27 NOTE — PLAN
[FreeTextEntry1] : reviewed labs with patient from June--\par a1c increased (from 6.4 to 7.3), patient on ozempic and jardiance \par diabetes uncontrolled \par mentioned was unable to get ozempic \par mentioned has not been careful with diet, exercise-- encouraged diet, exercise-- lifestyle mods\par mentioned more compliant with meds, trying to be more careful with diet, exercise \par high chol \par encouraged diet, exercise \par cont meds \par rpa in 3 months for repeat labs\par declined changes in meds, nutritionist

## 2023-07-28 ENCOUNTER — APPOINTMENT (OUTPATIENT)
Dept: NEUROLOGY | Facility: CLINIC | Age: 43
End: 2023-07-28
Payer: COMMERCIAL

## 2023-07-28 VITALS
HEIGHT: 66 IN | OXYGEN SATURATION: 98 % | DIASTOLIC BLOOD PRESSURE: 75 MMHG | HEART RATE: 96 BPM | BODY MASS INDEX: 27 KG/M2 | WEIGHT: 168 LBS | SYSTOLIC BLOOD PRESSURE: 115 MMHG | TEMPERATURE: 97.1 F

## 2023-07-28 DIAGNOSIS — T50.905A OTHER SPECIFIED NONSCARRING HAIR LOSS: ICD-10-CM

## 2023-07-28 DIAGNOSIS — L65.8 OTHER SPECIFIED NONSCARRING HAIR LOSS: ICD-10-CM

## 2023-07-28 PROCEDURE — 99214 OFFICE O/P EST MOD 30 MIN: CPT

## 2023-08-02 LAB
ALBUMIN SERPL ELPH-MCNC: 4.4 G/DL
ALP BLD-CCNC: 44 U/L
ALT SERPL-CCNC: 17 U/L
ANION GAP SERPL CALC-SCNC: 14 MMOL/L
AST SERPL-CCNC: 14 U/L
BILIRUB SERPL-MCNC: 0.3 MG/DL
BUN SERPL-MCNC: 13 MG/DL
CALCIUM SERPL-MCNC: 9.8 MG/DL
CHLORIDE SERPL-SCNC: 104 MMOL/L
CO2 SERPL-SCNC: 24 MMOL/L
CREAT SERPL-MCNC: 0.85 MG/DL
EGFR: 110 ML/MIN/1.73M2
GLUCOSE SERPL-MCNC: 138 MG/DL
LEVETIRACETAM SERPL-MCNC: 40.5 UG/ML
LEVETIRACETAM SERPL-MCNC: 70.2 UG/ML
POTASSIUM SERPL-SCNC: 4.3 MMOL/L
PROT SERPL-MCNC: 6.9 G/DL
SODIUM SERPL-SCNC: 142 MMOL/L
VALPROATE SERPL-MCNC: 73 UG/ML

## 2023-08-25 PROBLEM — L65.8 DRUG-RELATED HAIR LOSS: Status: ACTIVE | Noted: 2021-06-16

## 2023-08-25 NOTE — HISTORY OF PRESENT ILLNESS
[FreeTextEntry1] : 7/28/23 HPI:\par \par Rickey is a 42 year old male presenting for a follow up visit for seizures. \par \par Doing well in terms of seizures, taking Keppra 2000mg BID and Depakote 1000mg daily. REEG March 2023 normal. Interested in further tapering his medications. \par \par \par Prior:\par Last seen 3/30/23\par About 7-10d following, he was in [employer] with his daughter he suddenly developed dizziness and a gait imbalance at 11am. It lasted 60-90 minutes then disappeared as quickly as it began. Meds were taken about 2 hours prior. Had not skipped breakfast etc.\par \par The next week there was a sense of tingling on the top of the head, duration was a few minutes.\par \par Keppra is at 2000mg bid, the same, but now the VPA ER is dropped to 1250 qhs\par Keppra at 70.2 (about 35% higher than the last 2 levels), VPA 83.\par We awakens in the mornings feeling groggy, no matter how much he slept. He never feels rested.\par \par He still reports being tired.\par He slept for 8 hours last night but felt that he only slept 2 hours.\par Sugars have been controlled. He is told he is not snoring.\par He awakens first thing in the morning feeling very tired. He feels almost a brain fog - for 4 hours, and clears up nicely through the evening. He may stay up to 2am.\par It may be even worse.\par \par Has been working out and eating well in order to control his diabetes.,\par \par Side effects-- Hair loss is progressively worsening. He states he considered getting a hair transplant, but is too expensive. At this point is just "shaving it all off," tired of dealing with it.\par \par Additionally, 1x/week he wakes up in the middle of the night around 2am. It takes him several hours to go back to sleep,falling asleep around 4am. 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 Dermatologist ruled it as MRSA, but the treatments have not been successful in stopping progresing. Skin biopsy will be done in 1 month.\par \par VPA was 111 on 3x 500mg ERu s\par LEV 53.8 on 1000x2 bid.\par \par Jun/21: MRI brain seizure-protocol was negative for abnormalities.\par rEEG Jun 25, 2021: negative\par \par \par Prior history: Notes from 2018 showed his LEV was 2000mg, bid \par Onset was in juvenile age group, age 8, per notes. He recalls being in his living room, he felt frozen and could not turn his head, then dropped, found by family members.\par MRI was negative, EEG showed abnormalities but not specific.\par \par 2015 moved to [facility] from [place], followed at [facility] then, but transferred to Dr. Soto at [facility]. Most recent seizure was in 2014, that occurred while attempting to stop levetiracetam completely, per notes.\par \par He is noted to have irritability in the morning, but his wife has known him since he was on LEV.\par Depakote is well-tolerated, though has noted hair thinning gradually since 2009-10.\par \par Supplements include fish oil and multivitamins.\par \par Diabetic since 2007, runs on both side of the family. \par

## 2023-08-25 NOTE — PHYSICAL EXAM
[FreeTextEntry1] : General:\par Constitutional: Sitting comfortably in NAD.\par Psychiatric: well-groomed, appropriate affect\par Ears, Nose, Throat: no abnormalities, mucus membranes moist\par Neck: supple\par Extremities: no edema, clubbing or cyanosis\par Skin: no rash or neuro-cutaneous signs\par \par Cognitive:\par Orientation, language, memory and knowledge screens intact.\par \par Cranial Nerves:\par II: DANIELLE. III/IV/VI: EOM Full. VII: Face appears symmetric. VIII: Normal to screening. IX/X: Normal phonation. XI: Trapezius Symmetric. XII: Tongue midline. \par Motor: No tremor\par Power: No pronator drift.\par \par Normal gait.\par

## 2023-08-25 NOTE — DISCUSSION/SUMMARY
[FreeTextEntry1] : Impression: 1) Epilepsy, though seizure-free on dual therapy, but doses/levels high for both VPA and LEV in the past. He believes the LEV was pushed to 4000mg/d but not because he was still having seizures at 3000, but to provide that safety margin. Depakote may be contributing to adverse effects.  A comfortable goal for him is to remain on moderate doses of dual therapy, likely to bring LEV to 3000mg/d slowly as next step. 2) fatigue/grogginess - morning is difficult for him, though improving, possibly related to high doses/levels of medication that are slowly coming down, and will like to decrease carefully over time.  Plan: 1) Decrease Depakote to 750mg daily. REEG in 3 months, if normal can consider decreasing Keppra  2) Labs today

## 2023-09-10 ENCOUNTER — NON-APPOINTMENT (OUTPATIENT)
Age: 43
End: 2023-09-10

## 2023-10-11 ENCOUNTER — APPOINTMENT (OUTPATIENT)
Dept: ENDOCRINOLOGY | Facility: CLINIC | Age: 43
End: 2023-10-11
Payer: COMMERCIAL

## 2023-10-11 VITALS
SYSTOLIC BLOOD PRESSURE: 125 MMHG | DIASTOLIC BLOOD PRESSURE: 76 MMHG | HEART RATE: 91 BPM | WEIGHT: 167 LBS | BODY MASS INDEX: 26.84 KG/M2 | HEIGHT: 66 IN

## 2023-10-11 DIAGNOSIS — Z23 ENCOUNTER FOR IMMUNIZATION: ICD-10-CM

## 2023-10-11 LAB
GLUCOSE BLDC GLUCOMTR-MCNC: 179
HBA1C MFR BLD HPLC: 7.2

## 2023-10-11 PROCEDURE — 83036 HEMOGLOBIN GLYCOSYLATED A1C: CPT | Mod: QW

## 2023-10-11 PROCEDURE — 82962 GLUCOSE BLOOD TEST: CPT

## 2023-10-11 PROCEDURE — 90686 IIV4 VACC NO PRSV 0.5 ML IM: CPT

## 2023-10-11 PROCEDURE — 99214 OFFICE O/P EST MOD 30 MIN: CPT | Mod: 25

## 2023-10-11 PROCEDURE — G0008: CPT

## 2023-10-11 RX ORDER — ASPIRIN 81 MG/1
81 TABLET ORAL DAILY
Qty: 30 | Refills: 6 | Status: DISCONTINUED | COMMUNITY
Start: 2022-03-10 | End: 2023-10-11

## 2023-10-11 RX ORDER — LATANOPROSTENE BUNOD 0.24 MG/ML
0.02 SOLUTION/ DROPS OPHTHALMIC
Refills: 0 | Status: ACTIVE | COMMUNITY

## 2023-10-20 ENCOUNTER — APPOINTMENT (OUTPATIENT)
Dept: FAMILY MEDICINE | Facility: CLINIC | Age: 43
End: 2023-10-20
Payer: COMMERCIAL

## 2023-10-20 PROCEDURE — 36415 COLL VENOUS BLD VENIPUNCTURE: CPT

## 2023-10-23 LAB
ALBUMIN SERPL ELPH-MCNC: 4.8 G/DL
ALP BLD-CCNC: 42 U/L
ALT SERPL-CCNC: 24 U/L
ANION GAP SERPL CALC-SCNC: 11 MMOL/L
AST SERPL-CCNC: 22 U/L
BILIRUB SERPL-MCNC: 0.4 MG/DL
BUN SERPL-MCNC: 12 MG/DL
CALCIUM SERPL-MCNC: 9.9 MG/DL
CHLORIDE SERPL-SCNC: 104 MMOL/L
CHOLEST SERPL-MCNC: 161 MG/DL
CO2 SERPL-SCNC: 27 MMOL/L
CREAT SERPL-MCNC: 1.07 MG/DL
CREAT SPEC-SCNC: 281 MG/DL
EGFR: 87 ML/MIN/1.73M2
GLUCOSE SERPL-MCNC: 163 MG/DL
HCT VFR BLD CALC: 52.6 %
HDLC SERPL-MCNC: 43 MG/DL
HGB BLD-MCNC: 16.5 G/DL
LDLC SERPL CALC-MCNC: 97 MG/DL
MCHC RBC-ENTMCNC: 29.7 PG
MCHC RBC-ENTMCNC: 31.4 GM/DL
MCV RBC AUTO: 94.6 FL
MICROALBUMIN 24H UR DL<=1MG/L-MCNC: 2.9 MG/DL
MICROALBUMIN/CREAT 24H UR-RTO: 10 MG/G
NONHDLC SERPL-MCNC: 119 MG/DL
PLATELET # BLD AUTO: 240 K/UL
POTASSIUM SERPL-SCNC: 4.7 MMOL/L
PROT SERPL-MCNC: 7.2 G/DL
RBC # BLD: 5.56 M/UL
RBC # FLD: 13.7 %
SODIUM SERPL-SCNC: 142 MMOL/L
TRIGL SERPL-MCNC: 124 MG/DL
WBC # FLD AUTO: 7.93 K/UL

## 2023-11-03 ENCOUNTER — APPOINTMENT (OUTPATIENT)
Dept: FAMILY MEDICINE | Facility: CLINIC | Age: 43
End: 2023-11-03
Payer: COMMERCIAL

## 2023-11-03 VITALS
HEART RATE: 96 BPM | HEIGHT: 66 IN | OXYGEN SATURATION: 98 % | SYSTOLIC BLOOD PRESSURE: 114 MMHG | WEIGHT: 166.13 LBS | DIASTOLIC BLOOD PRESSURE: 73 MMHG | BODY MASS INDEX: 26.7 KG/M2 | TEMPERATURE: 96.9 F

## 2023-11-03 PROCEDURE — 99214 OFFICE O/P EST MOD 30 MIN: CPT

## 2024-01-12 ENCOUNTER — APPOINTMENT (OUTPATIENT)
Dept: NEUROLOGY | Facility: CLINIC | Age: 44
End: 2024-01-12
Payer: COMMERCIAL

## 2024-01-12 VITALS
SYSTOLIC BLOOD PRESSURE: 143 MMHG | OXYGEN SATURATION: 97 % | DIASTOLIC BLOOD PRESSURE: 77 MMHG | HEART RATE: 89 BPM | TEMPERATURE: 97.3 F

## 2024-01-12 DIAGNOSIS — Z86.69 PERSONAL HISTORY OF OTHER DISEASES OF THE NERVOUS SYSTEM AND SENSE ORGANS: ICD-10-CM

## 2024-01-12 PROCEDURE — 99214 OFFICE O/P EST MOD 30 MIN: CPT

## 2024-01-12 RX ORDER — LEVETIRACETAM 750 MG/1
750 TABLET, FILM COATED ORAL TWICE DAILY
Qty: 40 | Refills: 0 | Status: ACTIVE | COMMUNITY
Start: 2023-01-03 | End: 1900-01-01

## 2024-01-12 RX ORDER — DIVALPROEX SODIUM 250 MG/1
250 TABLET, EXTENDED RELEASE ORAL
Qty: 30 | Refills: 0 | Status: ACTIVE | COMMUNITY
Start: 2021-01-03 | End: 1900-01-01

## 2024-01-18 LAB
LEVETIRACETAM SERPL-MCNC: <2 UG/ML
VALPROATE SERPL-MCNC: 55 UG/ML

## 2024-01-19 NOTE — HISTORY OF PRESENT ILLNESS
[FreeTextEntry1] : 1/12/24 HPI: Rickey is a 43 year old male presenting for a follow up visit for seizures.  Decreased Depakote from 1000mg to 750mg daily. No complaints. Ready to continue decreasing medications.  Still working remote with occasional in office days. Stress is managed.   Has not yet had the EEG during the downtitration. No new adverse effects and has not noticed significant changes with the decreased doses of depakote.  Meds: Keppra 2000mg BID Depakote 750mg  ----------------------- Last seen 7/28/23: Doing well in terms of seizures, taking Keppra 2000mg BID and Depakote 1000mg daily. REEG March 2023 normal. Interested in further tapering his medications.  Prior: Last seen 3/30/23 About 7-10d following, he was in [employer] with his daughter he suddenly developed dizziness and a gait imbalance at 11am. It lasted 60-90 minutes then disappeared as quickly as it began. Meds were taken about 2 hours prior. Had not skipped breakfast etc.  The next week there was a sense of tingling on the top of the head, duration was a few minutes.  Keppra is at 2000mg bid, the same, but now the VPA ER is dropped to 1250 qhs Keppra at 70.2 (about 35% higher than the last 2 levels), VPA 83. We awakens in the mornings feeling groggy, no matter how much he slept. He never feels rested.  He still reports being tired. He slept for 8 hours last night but felt that he only slept 2 hours.  Sugars have been controlled. He is told he is not snoring. He awakens first thing in the morning feeling very tired. He feels almost a brain fog - for 4 hours, and clears up nicely through the evening. He may stay up to 2am. It may be even worse.  Has been working out and eating well in order to control his diabetes., Side effects-- Hair loss is progressively worsening. He states he considered getting a hair transplant, but is too expensive. At this point is just "shaving it all off," tired of dealing with it.  Additionally, 1x/week he wakes up in the middle of the night around 2am. It takes him several hours to go back to sleep,falling asleep around 4am. He states it impacts his work day on those days since he has to go to work at 7am.  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 Dermatologist ruled it as MRSA, but the treatments have not been successful in stopping progresing. Skin biopsy will be done in 1 month.  VPA was 111 on 3x 500mg ERu s LEV 53.8 on 1000x2 bid. Jun/21: MRI brain seizure-protocol was negative for abnormalities. rEEG Jun 25, 2021: negative  Prior history: Notes from 2018 showed his LEV was 2000mg, bid Onset was in juvenile age group, age 8, per notes. He recalls being in his living room, he felt frozen and could not turn his head, then dropped, found by family members. MRI was negative, EEG showed abnormalities but not specific.  2015 moved to [facility] from [place], followed at [facility], but transferred to Dr. Soto at [facility]. Most recent seizure was in 2014, that occurred while attempting to stop levetiracetam completely, per notes.  He is noted to have irritability in the morning, but his wife has known him since he was on LEV. Depakote is well-tolerated, though has noted hair thinning gradually since 2009-10. Supplements include fish oil and multivitamins.  Diabetic since 2007, runs on both side of the family.

## 2024-01-19 NOTE — PHYSICAL EXAM
[FreeTextEntry1] : General: Constitutional: Sitting comfortably in NAD. Psychiatric: well-groomed, appropriate affect Ears, Nose, Throat: no abnormalities, mucus membranes moist Neck: supple Extremities: no edema, clubbing or cyanosis Skin: no rash or neuro-cutaneous signs  Cognitive: Orientation, language, memory and knowledge screens intact.  Cranial Nerves: II: DANIELLE. III/IV/VI: EOM Full. VII: Face appears symmetric. VIII: Normal to screening. IX/X: Normal phonation. XI: Trapezius Symmetric. XII: Tongue midline.  Motor: No tremor Power: No pronator drift.  Normal gait.

## 2024-01-19 NOTE — DISCUSSION/SUMMARY
[FreeTextEntry1] : Impression: 1) Epilepsy, though seizure-free on dual therapy, doses/levels high for both VPA and LEV. He believes the LEV was pushed to 4000mg/d but not because he was still having seizures at 3000, but to provide that safety margin. Depakote may be contributing to adverse effects. A comfortable goal for him is to remain on moderate doses of dual therapy, so time now to drop LEV to 3000mg/d 2) fatigue/grogginess - morning is difficult for him, though improving, possibly related to high doses/levels of medication that are slowly coming down, and will like to decrease carefully over time.  Plan: 1) Decrease Keppra to 1500mg BID 2) Levels today 3) rEEG and will need ambEEG if wanting to decrease further.

## 2024-01-19 NOTE — END OF VISIT
[FreeTextEntry3] : I personally saw and evaluated this patient with LILIA Zafar and agree with the history, exam, and plan as outlined in this note.

## 2024-01-29 ENCOUNTER — TRANSCRIPTION ENCOUNTER (OUTPATIENT)
Age: 44
End: 2024-01-29

## 2024-02-02 ENCOUNTER — APPOINTMENT (OUTPATIENT)
Dept: NEUROLOGY | Facility: CLINIC | Age: 44
End: 2024-02-02
Payer: COMMERCIAL

## 2024-02-02 PROCEDURE — 95816 EEG AWAKE AND DROWSY: CPT

## 2024-02-15 ENCOUNTER — APPOINTMENT (OUTPATIENT)
Dept: NEUROLOGY | Facility: CLINIC | Age: 44
End: 2024-02-15
Payer: COMMERCIAL

## 2024-02-15 DIAGNOSIS — R56.9 UNSPECIFIED CONVULSIONS: ICD-10-CM

## 2024-02-15 PROCEDURE — 99214 OFFICE O/P EST MOD 30 MIN: CPT | Mod: 95

## 2024-02-15 NOTE — HISTORY OF PRESENT ILLNESS
[FreeTextEntry1] : 1/12/24 HPI: Rickey is a 43 year old male presenting for a follow up visit for seizures.  Patient has had no concerns with seizures. Mother felt that his hair may be thinning less now. May still be a bit too easily irritated.  rEEG Feb/2024 negative. Jan 12; VPA 55 Depakote 750mg daily and LEV is at 1500mg bid. No complaints. Ready to continue decreasing medications.  Still working remote with occasional in office days. Stress is managed.   Has not yet had the EEG during the down-titration. No new adverse effects and has not noticed significant changes with the decreased doses of depakote.  Meds: Keppra 2000mg BID Depakote 750mg  ----------------------- Last seen 7/28/23: Doing well in terms of seizures, taking Keppra 2000mg BID and Depakote 1000mg daily. REEG March 2023 normal. Interested in further tapering his medications.  Prior: Last seen 3/30/23 About 7-10d following, he was in [employer] with his daughter he suddenly developed dizziness and a gait imbalance at 11am. It lasted 60-90 minutes then disappeared as quickly as it began. Meds were taken about 2 hours prior. Had not skipped breakfast etc.  The next week there was a sense of tingling on the top of the head, duration was a few minutes.  Keppra is at 2000mg bid, the same, but now the VPA ER is dropped to 1250 qhs Keppra at 70.2 (about 35% higher than the last 2 levels), VPA 83. We awakens in the mornings feeling groggy, no matter how much he slept. He never feels rested.  He still reports being tired. He slept for 8 hours last night but felt that he only slept 2 hours.  Sugars have been controlled. He is told he is not snoring. He awakens first thing in the morning feeling very tired. He feels almost a brain fog - for 4 hours, and clears up nicely through the evening. He may stay up to 2am. It may be even worse.  Has been working out and eating well in order to control his diabetes., Side effects-- Hair loss is progressively worsening. He states he considered getting a hair transplant, but is too expensive. At this point is just "shaving it all off," tired of dealing with it.  Additionally, 1x/week he wakes up in the middle of the night around 2am. It takes him several hours to go back to sleep,falling asleep around 4am. He states it impacts his work day on those days since he has to go to work at 7am.  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 Dermatologist ruled it as MRSA, but the treatments have not been successful in stopping progresing. Skin biopsy will be done in 1 month.  VPA was 111 on 3x 500mg ERu s LEV 53.8 on 1000x2 bid. Jun/21: MRI brain seizure-protocol was negative for abnormalities. rEEG Jun 25, 2021: negative  Prior history: Notes from 2018 showed his LEV was 2000mg, bid Onset was in juvenile age group, age 8, per notes. He recalls being in his living room, he felt frozen and could not turn his head, then dropped, found by family members. MRI was negative, EEG showed abnormalities but not specific.  2015 moved to [facility] from [place], followed at [facility], but transferred to Dr. Soto at [facility]. Most recent seizure was in 2014, that occurred while attempting to stop levetiracetam completely, per notes.  He is noted to have irritability in the morning, but his wife has known him since he was on LEV. Depakote is well-tolerated, though has noted hair thinning gradually since 2009-10. Supplements include fish oil and multivitamins.  Diabetic since 2007, runs on both side of the family.

## 2024-02-15 NOTE — DISCUSSION/SUMMARY
[FreeTextEntry1] : Impression: 1) Epilepsy, though seizure-free on dual therapy, but doses/levels high for both VPA and LEV in the past. He believes the LEV was pushed to 4000mg/d but not because he was still having seizures at 3000, but to provide that safety margin. Depakote may be contributing to adverse effects.  A comfortable goal for him is to remain on moderate doses of dual therapy, likely to bring LEV to 1000mg slowly as next step. 2) fatigue/grogginess - morning is difficult for him, though improving, possibly related to high doses/levels of medication that are slowly coming down, and will like to decrease carefully over time.  Plan: 1) Decrease Depakote to 750mg daily.  2) LEV to 500+750 qam, [id number] then as of March 1 to 1000/1500mg bid

## 2024-02-15 NOTE — PHYSICAL EXAM
[FreeTextEntry1] : General: Constitutional:  Sitting comfortably in NAD. Psychiatric: well-groomed, appropriate affect, insight/judgment intact  Cognitive: Orientation, language, memory and knowledge screens intact. No expressive or receptive errors.  Cranial Nerves: VII: Face appears symmetric   Motor: Power: no pronator drift.  Normal spontaneous movements x 2 U/E.

## 2024-02-23 ENCOUNTER — APPOINTMENT (OUTPATIENT)
Dept: FAMILY MEDICINE | Facility: CLINIC | Age: 44
End: 2024-02-23
Payer: COMMERCIAL

## 2024-02-23 PROCEDURE — 36415 COLL VENOUS BLD VENIPUNCTURE: CPT

## 2024-02-27 LAB
ALBUMIN SERPL ELPH-MCNC: 4.5 G/DL
ALP BLD-CCNC: 41 U/L
ALT SERPL-CCNC: 24 U/L
ANION GAP SERPL CALC-SCNC: 15 MMOL/L
AST SERPL-CCNC: 21 U/L
BASOPHILS # BLD AUTO: 0.03 K/UL
BASOPHILS NFR BLD AUTO: 0.4 %
BILIRUB SERPL-MCNC: 0.6 MG/DL
BUN SERPL-MCNC: 16 MG/DL
CALCIUM SERPL-MCNC: 9.3 MG/DL
CHLORIDE SERPL-SCNC: 103 MMOL/L
CHOLEST SERPL-MCNC: 167 MG/DL
CO2 SERPL-SCNC: 22 MMOL/L
CREAT SERPL-MCNC: 0.98 MG/DL
EGFR: 97 ML/MIN/1.73M2
EOSINOPHIL # BLD AUTO: 0.2 K/UL
EOSINOPHIL NFR BLD AUTO: 2.5 %
ESTIMATED AVERAGE GLUCOSE: 166 MG/DL
GLUCOSE SERPL-MCNC: 142 MG/DL
HBA1C MFR BLD HPLC: 7.4 %
HCT VFR BLD CALC: 47.6 %
HDLC SERPL-MCNC: 42 MG/DL
HGB BLD-MCNC: 15.5 G/DL
IMM GRANULOCYTES NFR BLD AUTO: 0.1 %
LDLC SERPL CALC-MCNC: 100 MG/DL
LYMPHOCYTES # BLD AUTO: 4.17 K/UL
LYMPHOCYTES NFR BLD AUTO: 53 %
MAN DIFF?: NORMAL
MCHC RBC-ENTMCNC: 29.2 PG
MCHC RBC-ENTMCNC: 32.6 GM/DL
MCV RBC AUTO: 89.8 FL
MONOCYTES # BLD AUTO: 0.59 K/UL
MONOCYTES NFR BLD AUTO: 7.5 %
NEUTROPHILS # BLD AUTO: 2.87 K/UL
NEUTROPHILS NFR BLD AUTO: 36.5 %
NONHDLC SERPL-MCNC: 126 MG/DL
PLATELET # BLD AUTO: 221 K/UL
POTASSIUM SERPL-SCNC: 4.7 MMOL/L
PROT SERPL-MCNC: 7.2 G/DL
RBC # BLD: 5.3 M/UL
RBC # FLD: 13.2 %
SODIUM SERPL-SCNC: 141 MMOL/L
TRIGL SERPL-MCNC: 146 MG/DL
TSH SERPL-ACNC: 2.35 UIU/ML
WBC # FLD AUTO: 7.87 K/UL

## 2024-03-08 ENCOUNTER — APPOINTMENT (OUTPATIENT)
Dept: FAMILY MEDICINE | Facility: CLINIC | Age: 44
End: 2024-03-08

## 2024-03-11 ENCOUNTER — APPOINTMENT (OUTPATIENT)
Dept: FAMILY MEDICINE | Facility: CLINIC | Age: 44
End: 2024-03-11
Payer: COMMERCIAL

## 2024-03-11 DIAGNOSIS — E11.9 TYPE 2 DIABETES MELLITUS W/OUT COMPLICATIONS: ICD-10-CM

## 2024-03-11 DIAGNOSIS — E78.00 PURE HYPERCHOLESTEROLEMIA, UNSPECIFIED: ICD-10-CM

## 2024-03-11 DIAGNOSIS — I10 ESSENTIAL (PRIMARY) HYPERTENSION: ICD-10-CM

## 2024-03-11 PROCEDURE — G2211 COMPLEX E/M VISIT ADD ON: CPT | Mod: NC,1L

## 2024-03-11 PROCEDURE — 99214 OFFICE O/P EST MOD 30 MIN: CPT

## 2024-03-11 RX ORDER — EMPAGLIFLOZIN 25 MG/1
25 TABLET, FILM COATED ORAL
Qty: 90 | Refills: 3 | Status: ACTIVE | COMMUNITY
Start: 2019-02-04

## 2024-03-11 NOTE — HEALTH RISK ASSESSMENT
[0] : 1) Little interest or pleasure doing things: Not at all (0) [PHQ-2 Negative - No further assessment needed] : PHQ-2 Negative - No further assessment needed [[id number]] : 0 [Former] : Former [10-14] : 10-14 [< 15 Years] : < 15 Years

## 2024-03-11 NOTE — PLAN
[FreeTextEntry1] : reviewed labs with patient a1c increased to 7.4, endo pending  cholesterol stable  cbc, cmp, tsh wnl  repeat in 3 months  cont meds  encouraged healthy lifestyles-- diet and exercise

## 2024-03-11 NOTE — HISTORY OF PRESENT ILLNESS
[Home] : at home, [unfilled] , at the time of the visit. [Medical Office: ([facility])___] : at the medical office located in  [Verbal consent obtained from patient] : the patient, [unfilled] [FreeTextEntry1] : follow up labs and meds  [de-identified] : 44 yo m presents to review labs and meds  recently met with neuro, meds were adjusted  follow up pending with endo

## 2024-04-01 ENCOUNTER — APPOINTMENT (OUTPATIENT)
Dept: ENDOCRINOLOGY | Facility: CLINIC | Age: 44
End: 2024-04-01
Payer: COMMERCIAL

## 2024-04-01 PROCEDURE — 99214 OFFICE O/P EST MOD 30 MIN: CPT

## 2024-04-01 PROCEDURE — G2211 COMPLEX E/M VISIT ADD ON: CPT | Mod: NC,1L

## 2024-04-01 RX ORDER — SEMAGLUTIDE 1.34 MG/ML
4 INJECTION, SOLUTION SUBCUTANEOUS
Qty: 9 | Refills: 3 | Status: DISCONTINUED | COMMUNITY
Start: 2019-02-01 | End: 2024-04-01

## 2024-04-01 RX ORDER — ATORVASTATIN CALCIUM 20 MG/1
20 TABLET, FILM COATED ORAL
Qty: 90 | Refills: 3 | Status: ACTIVE | COMMUNITY
Start: 2019-09-24 | End: 1900-01-01

## 2024-04-01 RX ORDER — LEVETIRACETAM 1000 MG/1
1000 TABLET, FILM COATED ORAL
Qty: 120 | Refills: 0 | Status: ACTIVE | COMMUNITY
Start: 2023-12-02

## 2024-04-01 NOTE — DATA REVIEWED
[FreeTextEntry1] : 2/24  A1c 7.4%  tot chol 167, trig 146, HDL 42,  .  TSH 2.35 10/23  A1c 7.2% urine alb/cr 10 6/23  A1c 7.3%, tot chol 155, trig 138, HDL 40, LDL 88 2/22: A1c 6.9%, tot chol 137, trig 140, HDL 41, LDL 72, TSH 2.43 11/21: A1c 7.1%, tot chol 178, HDL 43, trig 162, , urine alb/cr 7, TSH 5.99 12/20: A1c 6.6%, tot chol 157, HDL 42, LDL 90, trig 152, CMP normal 5/20: A1c 7.1%, urine microalbumin/cr 6, B12 756, TSH 2.76 11/19: A1c 6.3%, tot chol 137, trig 238, HDL 42, LDL 47 8/19: A1c 6.2%, Cr 0.92, tot chol 185, trig 143, HDL 46, , urine microalbumin 9, TSH 2.27.   thyroid sono, 1/20: homogenous, normovascular L mid pole cyst 3mm

## 2024-04-01 NOTE — ASSESSMENT
[FreeTextEntry1] : Diabetes, goal A1c < 7.0%. Will try stopping Ozempic to see if GI symptoms improve;  if they don't improve, he can go back to Ozempic. I wanted to try a different GLP1 medication, either Trulicity or Mounjaro; he wants to see if he can manage sugars with more exercise and Jardiance 25mg. I recommended he begin testing sugars at home, 3x/week and if sugars are increasing significantly, will restart GLP1. Advised if am glucose > 150, to contact me. continue statin therapy RTO 6 months

## 2024-04-01 NOTE — HISTORY OF PRESENT ILLNESS
[FreeTextEntry1] : This visit was provided via telehealth using real-time 2-way audio visual technology. The patient, NAME HERE,  was located at home ([place]), at the time of the visit. The provider, JULIO CESAR MESA, was located at the medical office located in [place], [place],   at the time of the visit. The patient, NAME HERE,  and Provider participated in the telehealth encounter.   no health issues since last visit. he is having more GI symptoms that may be due to Ozempic -- bloating, gas, more urgent and frequent BMs, though not diarrhea.  Symptoms occur even if he eats a small and non greasy meal. He is going to [employer] 2-3x per week. no neuropathy symptoms.  He has numbness in pointer finger that resolved on its own. up to date with ophtho; eye pressure has stabilized with eye drops. labs reviewed from 2/24:  A1c 7.4%  .  TSH 2.35 Sugars are always higher around the holidays but now he is back to exercising more regularly.  Meds:  Ozempic 1mg/week, Jardiance 25mg atrovastatin 10mg, aspirin 81mg Keppra 1g  am and 1.5gpm, Depakote 750mg daily losartan 25mg Previous meds: metformin, Glucophage XR (diarrhea)

## 2024-04-15 ENCOUNTER — TRANSCRIPTION ENCOUNTER (OUTPATIENT)
Age: 44
End: 2024-04-15

## 2024-04-15 RX ORDER — LANCETS 30 GAUGE
EACH MISCELLANEOUS
Qty: 100 | Refills: 3 | Status: ACTIVE | COMMUNITY
Start: 2024-04-15 | End: 1900-01-01

## 2024-04-15 RX ORDER — BLOOD SUGAR DIAGNOSTIC
STRIP MISCELLANEOUS
Qty: 1 | Refills: 3 | Status: ACTIVE | COMMUNITY
Start: 2024-04-15 | End: 1900-01-01

## 2024-05-07 ENCOUNTER — TRANSCRIPTION ENCOUNTER (OUTPATIENT)
Age: 44
End: 2024-05-07

## 2024-05-08 ENCOUNTER — TRANSCRIPTION ENCOUNTER (OUTPATIENT)
Age: 44
End: 2024-05-08

## 2024-05-08 RX ORDER — TIRZEPATIDE 2.5 MG/.5ML
2.5 INJECTION, SOLUTION SUBCUTANEOUS
Qty: 2 | Refills: 5 | Status: ACTIVE | COMMUNITY
Start: 2024-05-08 | End: 1900-01-01

## 2024-06-13 ENCOUNTER — TRANSCRIPTION ENCOUNTER (OUTPATIENT)
Age: 44
End: 2024-06-13

## 2024-06-13 ENCOUNTER — RX RENEWAL (OUTPATIENT)
Age: 44
End: 2024-06-13

## 2024-06-13 RX ORDER — LEVETIRACETAM 500 MG/1
500 TABLET, FILM COATED ORAL
Qty: 180 | Refills: 1 | Status: ACTIVE | COMMUNITY
Start: 2024-02-15 | End: 1900-01-01

## 2024-09-23 ENCOUNTER — APPOINTMENT (OUTPATIENT)
Dept: FAMILY MEDICINE | Facility: CLINIC | Age: 44
End: 2024-09-23
Payer: COMMERCIAL

## 2024-09-23 VITALS
TEMPERATURE: 97.7 F | HEIGHT: 66 IN | DIASTOLIC BLOOD PRESSURE: 84 MMHG | WEIGHT: 169 LBS | OXYGEN SATURATION: 98 % | BODY MASS INDEX: 27.16 KG/M2 | HEART RATE: 95 BPM | SYSTOLIC BLOOD PRESSURE: 126 MMHG

## 2024-09-23 DIAGNOSIS — E78.00 PURE HYPERCHOLESTEROLEMIA, UNSPECIFIED: ICD-10-CM

## 2024-09-23 DIAGNOSIS — I10 ESSENTIAL (PRIMARY) HYPERTENSION: ICD-10-CM

## 2024-09-23 DIAGNOSIS — E11.9 TYPE 2 DIABETES MELLITUS W/OUT COMPLICATIONS: ICD-10-CM

## 2024-09-23 PROCEDURE — 99214 OFFICE O/P EST MOD 30 MIN: CPT

## 2024-09-23 PROCEDURE — G2211 COMPLEX E/M VISIT ADD ON: CPT | Mod: NC

## 2024-09-23 NOTE — HEALTH RISK ASSESSMENT
[0] : 2) Feeling down, depressed, or hopeless: Not at all (0) [PHQ-2 Negative - No further assessment needed] : PHQ-2 Negative - No further assessment needed [Former] : Former [10-14] : 10-14 [< 15 Years] : < 15 Years [[id number]] : 0

## 2024-09-23 NOTE — HISTORY OF PRESENT ILLNESS
[FreeTextEntry1] : follow up  [de-identified] : 44 yo m presents to review labs and meds  follow up pending with endo

## 2024-09-23 NOTE — PLAN
[FreeTextEntry1] : reviewed labs with patient a1c increased to 7.4, endo pending, repeat labs pending   cholesterol stable, repeat labs pending   cbc, cmp pending as well  repeat in 3 months  cont meds  bp stable  encouraged healthy lifestyles-- diet and exercise

## 2024-09-24 ENCOUNTER — APPOINTMENT (OUTPATIENT)
Dept: FAMILY MEDICINE | Facility: CLINIC | Age: 44
End: 2024-09-24
Payer: COMMERCIAL

## 2024-09-24 PROCEDURE — 36415 COLL VENOUS BLD VENIPUNCTURE: CPT

## 2024-10-02 LAB
ALBUMIN SERPL ELPH-MCNC: 4.4 G/DL
ALP BLD-CCNC: 55 U/L
ALT SERPL-CCNC: 34 U/L
ANION GAP SERPL CALC-SCNC: 15 MMOL/L
AST SERPL-CCNC: 20 U/L
BASOPHILS # BLD AUTO: 0.04 K/UL
BASOPHILS NFR BLD AUTO: 0.5 %
BILIRUB SERPL-MCNC: 0.5 MG/DL
BUN SERPL-MCNC: 12 MG/DL
CALCIUM SERPL-MCNC: 9.5 MG/DL
CHLORIDE SERPL-SCNC: 101 MMOL/L
CHOLEST SERPL-MCNC: 204 MG/DL
CO2 SERPL-SCNC: 25 MMOL/L
CREAT SERPL-MCNC: 1.03 MG/DL
EGFR: 91 ML/MIN/1.73M2
EOSINOPHIL # BLD AUTO: 0.28 K/UL
EOSINOPHIL NFR BLD AUTO: 3.6 %
ESTIMATED AVERAGE GLUCOSE: 229 MG/DL
GLUCOSE SERPL-MCNC: 209 MG/DL
HBA1C MFR BLD HPLC: 9.6 %
HCT VFR BLD CALC: 50.5 %
HDLC SERPL-MCNC: 42 MG/DL
HGB BLD-MCNC: 15.7 G/DL
IMM GRANULOCYTES NFR BLD AUTO: 0.1 %
LDLC SERPL CALC-MCNC: 114 MG/DL
LYMPHOCYTES # BLD AUTO: 3.97 K/UL
LYMPHOCYTES NFR BLD AUTO: 50.6 %
MAN DIFF?: NORMAL
MCHC RBC-ENTMCNC: 29.1 PG
MCHC RBC-ENTMCNC: 31.1 GM/DL
MCV RBC AUTO: 93.7 FL
MONOCYTES # BLD AUTO: 0.59 K/UL
MONOCYTES NFR BLD AUTO: 7.5 %
NEUTROPHILS # BLD AUTO: 2.96 K/UL
NEUTROPHILS NFR BLD AUTO: 37.7 %
NONHDLC SERPL-MCNC: 162 MG/DL
PLATELET # BLD AUTO: 220 K/UL
POTASSIUM SERPL-SCNC: 5 MMOL/L
PROT SERPL-MCNC: 7.1 G/DL
RBC # BLD: 5.39 M/UL
RBC # FLD: 13.2 %
SODIUM SERPL-SCNC: 140 MMOL/L
TRIGL SERPL-MCNC: 280 MG/DL
WBC # FLD AUTO: 7.85 K/UL

## 2024-10-18 ENCOUNTER — APPOINTMENT (OUTPATIENT)
Dept: ENDOCRINOLOGY | Facility: CLINIC | Age: 44
End: 2024-10-18

## 2024-10-18 DIAGNOSIS — D72.820 LYMPHOCYTOSIS (SYMPTOMATIC): ICD-10-CM

## 2024-10-18 PROCEDURE — 99214 OFFICE O/P EST MOD 30 MIN: CPT

## 2024-10-18 RX ORDER — GLIMEPIRIDE 2 MG/1
2 TABLET ORAL
Qty: 90 | Refills: 1 | Status: ACTIVE | COMMUNITY
Start: 2024-10-18 | End: 1900-01-01

## 2024-10-18 RX ORDER — TIRZEPATIDE 5 MG/.5ML
5 INJECTION, SOLUTION SUBCUTANEOUS
Qty: 12 | Refills: 1 | Status: ACTIVE | COMMUNITY
Start: 2024-10-18 | End: 1900-01-01

## 2024-10-21 ENCOUNTER — TRANSCRIPTION ENCOUNTER (OUTPATIENT)
Age: 44
End: 2024-10-21

## 2024-11-04 ENCOUNTER — RX RENEWAL (OUTPATIENT)
Age: 44
End: 2024-11-04

## 2024-11-12 ENCOUNTER — TRANSCRIPTION ENCOUNTER (OUTPATIENT)
Age: 44
End: 2024-11-12

## 2024-11-12 RX ORDER — SEMAGLUTIDE 2.68 MG/ML
8 INJECTION, SOLUTION SUBCUTANEOUS
Qty: 1 | Refills: 3 | Status: ACTIVE | COMMUNITY
Start: 2024-11-12 | End: 1900-01-01

## 2024-11-18 ENCOUNTER — TRANSCRIPTION ENCOUNTER (OUTPATIENT)
Age: 44
End: 2024-11-18

## 2024-11-19 ENCOUNTER — TRANSCRIPTION ENCOUNTER (OUTPATIENT)
Age: 44
End: 2024-11-19

## 2024-11-22 ENCOUNTER — APPOINTMENT (OUTPATIENT)
Dept: HEMATOLOGY ONCOLOGY | Facility: CLINIC | Age: 44
End: 2024-11-22
Payer: COMMERCIAL

## 2024-11-22 VITALS
WEIGHT: 166 LBS | BODY MASS INDEX: 26.68 KG/M2 | TEMPERATURE: 208.04 F | OXYGEN SATURATION: 98 % | HEIGHT: 66 IN | DIASTOLIC BLOOD PRESSURE: 80 MMHG | HEART RATE: 91 BPM | SYSTOLIC BLOOD PRESSURE: 124 MMHG | RESPIRATION RATE: 18 BRPM

## 2024-11-22 DIAGNOSIS — D72.820 LYMPHOCYTOSIS (SYMPTOMATIC): ICD-10-CM

## 2024-11-22 LAB
HAV IGM SER QL: NONREACTIVE
HBV CORE IGM SER QL: NONREACTIVE
HBV SURFACE AG SER QL: NONREACTIVE
HCT VFR BLD CALC: 47.8 %
HCV AB SER QL: NONREACTIVE
HCV S/CO RATIO: 0.05 S/CO
HGB BLD-MCNC: 16.5 G/DL
HIV1+2 AB SPEC QL IA.RAPID: NONREACTIVE
LYMPHOCYTES # BLD AUTO: 3.8 K/UL
LYMPHOCYTES NFR BLD AUTO: 47.6 %
MAN DIFF?: NO
MCHC RBC-ENTMCNC: 29.7 PG
MCHC RBC-ENTMCNC: 34.5 G/DL
MCV RBC AUTO: 86.1 FL
NEUTROPHILS # BLD AUTO: 4 K/UL
NEUTROPHILS NFR BLD AUTO: 51.6 %
PLATELET # BLD AUTO: 230 K/UL
RBC # BLD: 5.55 M/UL
RBC # FLD: 13.4 %
WBC # FLD AUTO: 7.9 K/UL

## 2024-11-22 PROCEDURE — 99203 OFFICE O/P NEW LOW 30 MIN: CPT

## 2024-12-04 ENCOUNTER — TRANSCRIPTION ENCOUNTER (OUTPATIENT)
Age: 44
End: 2024-12-04

## 2024-12-11 ENCOUNTER — TRANSCRIPTION ENCOUNTER (OUTPATIENT)
Age: 44
End: 2024-12-11

## 2024-12-11 RX ORDER — PEN NEEDLE, DIABETIC 32 GX 1/4"
32G X 6 MM NEEDLE, DISPOSABLE MISCELLANEOUS
Qty: 1 | Refills: 0 | Status: ACTIVE | COMMUNITY
Start: 2024-12-11 | End: 1900-01-01

## 2024-12-23 ENCOUNTER — APPOINTMENT (OUTPATIENT)
Dept: FAMILY MEDICINE | Facility: CLINIC | Age: 44
End: 2024-12-23
Payer: COMMERCIAL

## 2024-12-23 PROCEDURE — 36415 COLL VENOUS BLD VENIPUNCTURE: CPT

## 2024-12-24 LAB
ALBUMIN SERPL ELPH-MCNC: 4.5 G/DL
ALP BLD-CCNC: 48 U/L
ALT SERPL-CCNC: 16 U/L
ANION GAP SERPL CALC-SCNC: 16 MMOL/L
AST SERPL-CCNC: 18 U/L
BILIRUB SERPL-MCNC: 0.5 MG/DL
BUN SERPL-MCNC: 14 MG/DL
CALCIUM SERPL-MCNC: 9.7 MG/DL
CHLORIDE SERPL-SCNC: 103 MMOL/L
CHOLEST SERPL-MCNC: 149 MG/DL
CO2 SERPL-SCNC: 24 MMOL/L
CREAT SERPL-MCNC: 1.08 MG/DL
EGFR: 86 ML/MIN/1.73M2
ESTIMATED AVERAGE GLUCOSE: 160 MG/DL
GLUCOSE SERPL-MCNC: 139 MG/DL
HBA1C MFR BLD HPLC: 7.2 %
HDLC SERPL-MCNC: 40 MG/DL
LDLC SERPL CALC-MCNC: 84 MG/DL
NONHDLC SERPL-MCNC: 109 MG/DL
POTASSIUM SERPL-SCNC: 4.8 MMOL/L
PROT SERPL-MCNC: 6.9 G/DL
SODIUM SERPL-SCNC: 143 MMOL/L
TRIGL SERPL-MCNC: 145 MG/DL

## 2024-12-27 ENCOUNTER — APPOINTMENT (OUTPATIENT)
Dept: FAMILY MEDICINE | Facility: CLINIC | Age: 44
End: 2024-12-27
Payer: COMMERCIAL

## 2024-12-27 VITALS
HEART RATE: 94 BPM | DIASTOLIC BLOOD PRESSURE: 81 MMHG | SYSTOLIC BLOOD PRESSURE: 136 MMHG | TEMPERATURE: 97.2 F | OXYGEN SATURATION: 97 % | WEIGHT: 168 LBS | BODY MASS INDEX: 27 KG/M2 | HEIGHT: 66 IN

## 2024-12-27 DIAGNOSIS — E11.9 TYPE 2 DIABETES MELLITUS W/OUT COMPLICATIONS: ICD-10-CM

## 2024-12-27 DIAGNOSIS — R56.9 UNSPECIFIED CONVULSIONS: ICD-10-CM

## 2024-12-27 DIAGNOSIS — E78.00 PURE HYPERCHOLESTEROLEMIA, UNSPECIFIED: ICD-10-CM

## 2024-12-27 DIAGNOSIS — I10 ESSENTIAL (PRIMARY) HYPERTENSION: ICD-10-CM

## 2024-12-27 PROCEDURE — 99214 OFFICE O/P EST MOD 30 MIN: CPT

## 2024-12-27 PROCEDURE — 93000 ELECTROCARDIOGRAM COMPLETE: CPT

## 2024-12-27 PROCEDURE — G2211 COMPLEX E/M VISIT ADD ON: CPT | Mod: NC

## 2025-02-28 ENCOUNTER — APPOINTMENT (OUTPATIENT)
Dept: HEMATOLOGY ONCOLOGY | Facility: CLINIC | Age: 45
End: 2025-02-28

## 2025-02-28 VITALS
HEIGHT: 66 IN | DIASTOLIC BLOOD PRESSURE: 91 MMHG | BODY MASS INDEX: 27.48 KG/M2 | TEMPERATURE: 98.7 F | RESPIRATION RATE: 18 BRPM | SYSTOLIC BLOOD PRESSURE: 152 MMHG | HEART RATE: 93 BPM | OXYGEN SATURATION: 99 % | WEIGHT: 171 LBS

## 2025-02-28 DIAGNOSIS — D72.820 LYMPHOCYTOSIS (SYMPTOMATIC): ICD-10-CM

## 2025-02-28 LAB
BASOPHILS # BLD AUTO: 0.04 K/UL
BASOPHILS NFR BLD AUTO: 0.5 %
CRP SERPL-MCNC: <3 MG/L
EOSINOPHIL # BLD AUTO: 0.28 K/UL
EOSINOPHIL NFR BLD AUTO: 3.3 %
ERYTHROCYTE [SEDIMENTATION RATE] IN BLOOD BY WESTERGREN METHOD: 1 MM/HR
HCT VFR BLD CALC: 46.9 %
HGB BLD-MCNC: 16 G/DL
IMM GRANULOCYTES NFR BLD AUTO: 0.1 %
LYMPHOCYTES # BLD AUTO: 4.03 K/UL
LYMPHOCYTES NFR BLD AUTO: 47 %
MAN DIFF?: NORMAL
MCHC RBC-ENTMCNC: 29.9 PG
MCHC RBC-ENTMCNC: 34.1 G/DL
MCV RBC AUTO: 87.7 FL
MONOCYTES # BLD AUTO: 0.81 K/UL
MONOCYTES NFR BLD AUTO: 9.5 %
NEUTROPHILS # BLD AUTO: 3.4 K/UL
NEUTROPHILS NFR BLD AUTO: 39.6 %
PLATELET # BLD AUTO: 228 K/UL
PMV BLD AUTO: 0 /100 WBCS
RBC # BLD: 5.35 M/UL
RBC # FLD: 13 %
WBC # FLD AUTO: 8.57 K/UL

## 2025-02-28 PROCEDURE — 99213 OFFICE O/P EST LOW 20 MIN: CPT

## 2025-03-01 LAB
ANISOCYTOSIS BLD QL: SLIGHT
BASOPHILS # BLD AUTO: 0.04 K/UL
BASOPHILS NFR BLD AUTO: 0.5 %
EOSINOPHIL # BLD AUTO: 0.28 K/UL
EOSINOPHIL NFR BLD AUTO: 3.3 %
LYMPHOCYTES # BLD AUTO: 4.03 K/UL
LYMPHOCYTES NFR BLD AUTO: 47 %
MANUAL SMEAR: NORMAL
MICROCYTES BLD QL SMEAR: SLIGHT
MONOCYTES # BLD AUTO: 0.81 K/UL
MONOCYTES NFR BLD AUTO: 9.5 %
NEUTROPHILS # BLD AUTO: 3.4 K/UL
NEUTROPHILS NFR BLD AUTO: 39.6 %
OVALOCYTES BLD QL SMEAR: SLIGHT
PLAT MORPH BLD: ABNORMAL
POIKILOCYTOSIS BLD QL SMEAR: SLIGHT
RBC BLD AUTO: ABNORMAL
SMUDGE CELLS # BLD: NORMAL

## 2025-03-04 ENCOUNTER — RX RENEWAL (OUTPATIENT)
Age: 45
End: 2025-03-04

## 2025-04-04 ENCOUNTER — RX RENEWAL (OUTPATIENT)
Age: 45
End: 2025-04-04

## 2025-04-07 ENCOUNTER — TRANSCRIPTION ENCOUNTER (OUTPATIENT)
Age: 45
End: 2025-04-07

## 2025-04-18 ENCOUNTER — APPOINTMENT (OUTPATIENT)
Dept: FAMILY MEDICINE | Facility: CLINIC | Age: 45
End: 2025-04-18

## 2025-04-18 PROCEDURE — 36415 COLL VENOUS BLD VENIPUNCTURE: CPT

## 2025-04-21 LAB
ALBUMIN SERPL ELPH-MCNC: 4.5 G/DL
ALP BLD-CCNC: 56 U/L
ALT SERPL-CCNC: 20 U/L
ANION GAP SERPL CALC-SCNC: 14 MMOL/L
AST SERPL-CCNC: 17 U/L
BILIRUB SERPL-MCNC: 0.5 MG/DL
BUN SERPL-MCNC: 11 MG/DL
CALCIUM SERPL-MCNC: 9.7 MG/DL
CHLORIDE SERPL-SCNC: 104 MMOL/L
CHOLEST SERPL-MCNC: 161 MG/DL
CO2 SERPL-SCNC: 25 MMOL/L
CREAT SERPL-MCNC: 0.96 MG/DL
EGFRCR SERPLBLD CKD-EPI 2021: 99 ML/MIN/1.73M2
ESTIMATED AVERAGE GLUCOSE: 137 MG/DL
GLUCOSE SERPL-MCNC: 118 MG/DL
HBA1C MFR BLD HPLC: 6.4 %
HDLC SERPL-MCNC: 43 MG/DL
LDLC SERPL-MCNC: 89 MG/DL
NONHDLC SERPL-MCNC: 118 MG/DL
POTASSIUM SERPL-SCNC: 4.3 MMOL/L
PROT SERPL-MCNC: 6.9 G/DL
SODIUM SERPL-SCNC: 143 MMOL/L
TRIGL SERPL-MCNC: 164 MG/DL

## 2025-04-24 ENCOUNTER — RX RENEWAL (OUTPATIENT)
Age: 45
End: 2025-04-24

## 2025-04-25 ENCOUNTER — APPOINTMENT (OUTPATIENT)
Dept: FAMILY MEDICINE | Facility: CLINIC | Age: 45
End: 2025-04-25
Payer: COMMERCIAL

## 2025-04-25 VITALS — DIASTOLIC BLOOD PRESSURE: 68 MMHG | SYSTOLIC BLOOD PRESSURE: 132 MMHG

## 2025-04-25 VITALS
OXYGEN SATURATION: 96 % | SYSTOLIC BLOOD PRESSURE: 148 MMHG | HEART RATE: 106 BPM | DIASTOLIC BLOOD PRESSURE: 77 MMHG | BODY MASS INDEX: 27.97 KG/M2 | HEIGHT: 66 IN | WEIGHT: 174 LBS | TEMPERATURE: 97 F

## 2025-04-25 DIAGNOSIS — I10 ESSENTIAL (PRIMARY) HYPERTENSION: ICD-10-CM

## 2025-04-25 DIAGNOSIS — E11.9 TYPE 2 DIABETES MELLITUS W/OUT COMPLICATIONS: ICD-10-CM

## 2025-04-25 DIAGNOSIS — E78.00 PURE HYPERCHOLESTEROLEMIA, UNSPECIFIED: ICD-10-CM

## 2025-04-25 PROCEDURE — 99214 OFFICE O/P EST MOD 30 MIN: CPT

## 2025-04-25 PROCEDURE — G2211 COMPLEX E/M VISIT ADD ON: CPT | Mod: NC

## 2025-05-07 ENCOUNTER — TRANSCRIPTION ENCOUNTER (OUTPATIENT)
Age: 45
End: 2025-05-07

## 2025-05-26 ENCOUNTER — NON-APPOINTMENT (OUTPATIENT)
Age: 45
End: 2025-05-26

## 2025-05-27 ENCOUNTER — APPOINTMENT (OUTPATIENT)
Dept: NEUROLOGY | Facility: CLINIC | Age: 45
End: 2025-05-27
Payer: COMMERCIAL

## 2025-05-27 DIAGNOSIS — R56.9 UNSPECIFIED CONVULSIONS: ICD-10-CM

## 2025-05-27 PROCEDURE — 99214 OFFICE O/P EST MOD 30 MIN: CPT | Mod: 95

## 2025-06-16 ENCOUNTER — RX RENEWAL (OUTPATIENT)
Age: 45
End: 2025-06-16

## 2025-06-18 ENCOUNTER — APPOINTMENT (OUTPATIENT)
Dept: NEUROLOGY | Facility: CLINIC | Age: 45
End: 2025-06-18
Payer: COMMERCIAL

## 2025-06-18 PROCEDURE — 95816 EEG AWAKE AND DROWSY: CPT

## 2025-07-16 ENCOUNTER — APPOINTMENT (OUTPATIENT)
Dept: GASTROENTEROLOGY | Facility: CLINIC | Age: 45
End: 2025-07-16

## 2025-08-01 ENCOUNTER — APPOINTMENT (OUTPATIENT)
Dept: FAMILY MEDICINE | Facility: CLINIC | Age: 45
End: 2025-08-01
Payer: COMMERCIAL

## 2025-08-01 DIAGNOSIS — E11.9 TYPE 2 DIABETES MELLITUS W/OUT COMPLICATIONS: ICD-10-CM

## 2025-08-01 PROCEDURE — 36415 COLL VENOUS BLD VENIPUNCTURE: CPT

## 2025-08-04 LAB
ESTIMATED AVERAGE GLUCOSE: 148 MG/DL
HBA1C MFR BLD HPLC: 6.8 %

## 2025-08-08 ENCOUNTER — APPOINTMENT (OUTPATIENT)
Dept: HEMATOLOGY ONCOLOGY | Facility: CLINIC | Age: 45
End: 2025-08-08

## 2025-08-08 ENCOUNTER — LABORATORY RESULT (OUTPATIENT)
Age: 45
End: 2025-08-08

## 2025-08-08 VITALS
BODY MASS INDEX: 27.48 KG/M2 | TEMPERATURE: 98.2 F | HEIGHT: 66 IN | RESPIRATION RATE: 18 BRPM | WEIGHT: 171 LBS | OXYGEN SATURATION: 98 % | HEART RATE: 83 BPM | DIASTOLIC BLOOD PRESSURE: 73 MMHG | SYSTOLIC BLOOD PRESSURE: 147 MMHG

## 2025-08-08 DIAGNOSIS — D72.820 LYMPHOCYTOSIS (SYMPTOMATIC): ICD-10-CM

## 2025-08-08 DIAGNOSIS — I10 ESSENTIAL (PRIMARY) HYPERTENSION: ICD-10-CM

## 2025-08-08 PROCEDURE — 99203 OFFICE O/P NEW LOW 30 MIN: CPT

## 2025-08-12 ENCOUNTER — NON-APPOINTMENT (OUTPATIENT)
Age: 45
End: 2025-08-12

## 2025-08-12 LAB
AUTO BASOPHILS #: 0.02 K/UL
AUTO BASOPHILS %: 0.2 %
AUTO EOSINOPHILS #: 0.2 K/UL
AUTO EOSINOPHILS %: 2.1 %
AUTO IMMATURE GRANULOCYTES #: 0.02 K/UL
AUTO LYMPHOCYTES #: 4.67 K/UL
AUTO LYMPHOCYTES %: 49.8 %
AUTO MONOCYTES #: 0.68 K/UL
AUTO MONOCYTES %: 7.3 %
AUTO NEUTROPHILS #: 3.78 K/UL
AUTO NEUTROPHILS %: 40.4 %
AUTO NRBC #: 0 K/UL
HCT VFR BLD CALC: 48 %
HGB BLD-MCNC: 16.3 G/DL
IMM GRANULOCYTES NFR BLD AUTO: 0.2 %
MAN DIFF?: NORMAL
MCHC RBC-ENTMCNC: 29.7 PG
MCHC RBC-ENTMCNC: 34 G/DL
MCV RBC AUTO: 87.4 FL
PLATELET # BLD AUTO: 238 K/UL
PMV BLD AUTO: 0 /100 WBCS
PMV BLD: 10.5 FL
RBC # BLD: 5.49 M/UL
RBC # FLD: 12.5 %
WBC # FLD AUTO: 9.37 K/UL

## 2025-08-15 ENCOUNTER — RX RENEWAL (OUTPATIENT)
Age: 45
End: 2025-08-15

## 2025-08-20 ENCOUNTER — RX RENEWAL (OUTPATIENT)
Age: 45
End: 2025-08-20

## 2025-08-28 ENCOUNTER — RX RENEWAL (OUTPATIENT)
Age: 45
End: 2025-08-28

## 2025-09-09 ENCOUNTER — TRANSCRIPTION ENCOUNTER (OUTPATIENT)
Age: 45
End: 2025-09-09

## 2025-09-10 ENCOUNTER — APPOINTMENT (OUTPATIENT)
Dept: GASTROENTEROLOGY | Facility: CLINIC | Age: 45
End: 2025-09-10

## 2025-09-11 ENCOUNTER — APPOINTMENT (OUTPATIENT)
Dept: FAMILY MEDICINE | Facility: CLINIC | Age: 45
End: 2025-09-11
Payer: COMMERCIAL

## 2025-09-11 VITALS
HEIGHT: 66 IN | WEIGHT: 174 LBS | DIASTOLIC BLOOD PRESSURE: 83 MMHG | OXYGEN SATURATION: 97 % | TEMPERATURE: 97.1 F | BODY MASS INDEX: 27.97 KG/M2 | HEART RATE: 93 BPM | SYSTOLIC BLOOD PRESSURE: 133 MMHG

## 2025-09-11 DIAGNOSIS — E11.9 TYPE 2 DIABETES MELLITUS W/OUT COMPLICATIONS: ICD-10-CM

## 2025-09-11 DIAGNOSIS — E78.00 PURE HYPERCHOLESTEROLEMIA, UNSPECIFIED: ICD-10-CM

## 2025-09-11 DIAGNOSIS — I10 ESSENTIAL (PRIMARY) HYPERTENSION: ICD-10-CM

## 2025-09-11 PROCEDURE — G0537: CPT

## 2025-09-11 PROCEDURE — G2211 COMPLEX E/M VISIT ADD ON: CPT | Mod: NC

## 2025-09-11 PROCEDURE — 99214 OFFICE O/P EST MOD 30 MIN: CPT

## 2025-09-16 ENCOUNTER — RX RENEWAL (OUTPATIENT)
Age: 45
End: 2025-09-16

## 2025-09-17 ENCOUNTER — TRANSCRIPTION ENCOUNTER (OUTPATIENT)
Age: 45
End: 2025-09-17

## 2025-09-26 PROBLEM — Z12.11 COLON CANCER SCREENING: Status: ACTIVE | Noted: 2025-09-26
